# Patient Record
Sex: FEMALE | Race: WHITE | NOT HISPANIC OR LATINO | Employment: OTHER | URBAN - METROPOLITAN AREA
[De-identification: names, ages, dates, MRNs, and addresses within clinical notes are randomized per-mention and may not be internally consistent; named-entity substitution may affect disease eponyms.]

---

## 2018-05-02 ENCOUNTER — OFFICE VISIT (OUTPATIENT)
Dept: GYNECOLOGIC ONCOLOGY | Facility: HOSPITAL | Age: 48
End: 2018-05-02
Payer: COMMERCIAL

## 2018-05-02 VITALS
HEIGHT: 61 IN | DIASTOLIC BLOOD PRESSURE: 78 MMHG | BODY MASS INDEX: 37.38 KG/M2 | HEART RATE: 92 BPM | SYSTOLIC BLOOD PRESSURE: 108 MMHG | WEIGHT: 198 LBS | TEMPERATURE: 98.7 F | RESPIRATION RATE: 16 BRPM

## 2018-05-02 DIAGNOSIS — D07.1 VIN III (VULVAR INTRAEPITHELIAL NEOPLASIA III): Primary | ICD-10-CM

## 2018-05-02 PROCEDURE — 99243 OFF/OP CNSLTJ NEW/EST LOW 30: CPT | Performed by: OBSTETRICS & GYNECOLOGY

## 2018-05-02 RX ORDER — TRAMADOL HYDROCHLORIDE 50 MG/1
50 TABLET ORAL AS NEEDED
COMMUNITY

## 2018-05-02 RX ORDER — CLONAZEPAM 2 MG/1
2 TABLET ORAL AS NEEDED
COMMUNITY
End: 2019-01-16 | Stop reason: HOSPADM

## 2018-05-02 RX ORDER — ESCITALOPRAM OXALATE 20 MG/1
20 TABLET ORAL DAILY
COMMUNITY

## 2018-05-02 RX ORDER — CYCLOBENZAPRINE HCL 5 MG
10 TABLET ORAL AS NEEDED
COMMUNITY

## 2018-05-02 RX ORDER — METOCLOPRAMIDE 5 MG/1
5 TABLET ORAL DAILY
COMMUNITY
End: 2019-01-16 | Stop reason: HOSPADM

## 2018-05-02 RX ORDER — RANITIDINE 150 MG/1
50 CAPSULE ORAL DAILY
COMMUNITY
End: 2019-01-16 | Stop reason: HOSPADM

## 2018-05-02 RX ORDER — LANOLIN ALCOHOL/MO/W.PET/CERES
1 CREAM (GRAM) TOPICAL DAILY
COMMUNITY

## 2018-05-02 RX ORDER — OMEPRAZOLE 40 MG/1
40 CAPSULE, DELAYED RELEASE ORAL DAILY
COMMUNITY

## 2018-05-02 RX ORDER — RANITIDINE 25 MG/ML
50 INJECTION, SOLUTION INTRAMUSCULAR; INTRAVENOUS EVERY 8 HOURS
COMMUNITY
End: 2019-01-16 | Stop reason: HOSPADM

## 2018-05-02 RX ORDER — IMIQUIMOD 12.5 MG/.25G
1 CREAM TOPICAL 2 TIMES WEEKLY
Qty: 32 PACKET | Refills: 0 | Status: SHIPPED | OUTPATIENT
Start: 2018-05-03 | End: 2019-01-16 | Stop reason: HOSPADM

## 2018-05-02 NOTE — PROGRESS NOTES
Assessment/Plan:    Problem List Items Addressed This Visit        Genitourinary    MARGO III (vulvar intraepithelial neoplasia III) - Primary       80-year-old with biopsy-proven MARGO 3 localized to the perineal body  Her performance status is 0     1  I encouraged continued cessation of cigarette smoking  2   We discussed the pathophysiology of HPV disease  3  I discussed to treatment options - surgical resection of the perineal body lesion versus aldara therapy  She understands the risks and benefits of both approaches and has agreed to aldara therapy  4   Aldara was prescribed twice weekly for a total of 16 weeks  She will return in approximately 2 months for interval evaluation  If she is unable to tolerate aldara, surgical resection will be performed  5   She understands that after treatment, she will require colposcopy approximately every 6 months and that regardless of treatment, dysplasia can recur on the vulva  Thank you for the courtesy of this consultation  All questions were answered by the end of the visit  Relevant Medications    imiquimod (ALDARA) 5 % cream (Start on 5/3/2018)              CHIEF COMPLAINT:       Subjective:     Problem:  Cancer Staging  No matching staging information was found for the patient  Previous therapy:   No history exists  Patient ID: Sheyla Kidd is a 52 y o  female    80-year-old referred as a consultation by Dr Jason Olszewski  to discuss treatment options for biopsy-proven MARGO 3  Vulvar biopsies were performed on 4/12/2018  Pap smear on 3/6/2018 was within normal limits  She has no current vulvar complaints  She quit smoking as of 2 days ago  Review of Systems   Genitourinary: Negative for pelvic pain and vaginal bleeding  All other systems reviewed and are negative        Current Outpatient Prescriptions   Medication Sig Dispense Refill    clonazePAM (KLONOPIN) 2 mg tablet Take 2 mg by mouth as needed for seizures      cyclobenzaprine (FLEXERIL) 5 mg tablet Take 10 mg by mouth as needed for muscle spasms      escitalopram (LEXAPRO) 20 mg tablet Take 20 mg by mouth daily      glucosamine-chondroitin 500-400 MG tablet Take 1 tablet by mouth daily      Linaclotide (LINZESS) 145 MCG CAPS Take 145 mcg by mouth daily      metoclopramide (REGLAN) 5 mg tablet Take 5 mg by mouth daily      omeprazole (PriLOSEC) 40 MG capsule Take 40 mg by mouth daily      raNITIdine (ZANTAC) 50 mg/2 mL injection 50 mg every 8 (eight) hours      traMADol (ULTRAM) 50 mg tablet Take 50 mg by mouth as needed for moderate pain      [START ON 5/3/2018] imiquimod (ALDARA) 5 % cream Apply 1 packet topically 2 (two) times a week 32 packet 0    ranitidine (ZANTAC) 150 MG capsule Take 50 mg by mouth daily       No current facility-administered medications for this visit  No Known Allergies    Past Medical History:   Diagnosis Date    Anxiety     Gastroparesis        Past Surgical History:   Procedure Laterality Date    BUNIONECTOMY       SECTION      CHOLECYSTECTOMY      COLONOSCOPY         OB History     No data available          Family History   Problem Relation Age of Onset    Cervical cancer Mother     Vaginal cancer Mother        The following portions of the patient's history were reviewed and updated as appropriate: allergies, current medications, past family history, past medical history, past social history, past surgical history and problem list       Objective:    Blood pressure 108/78, pulse 92, temperature 98 7 °F (37 1 °C), temperature source Oral, resp  rate 16, height 5' 1" (1 549 m), weight 89 8 kg (198 lb)  Body mass index is 37 41 kg/m²  Physical Exam   Constitutional: She is oriented to person, place, and time  She appears well-developed and well-nourished  No distress  Pulmonary/Chest: Effort normal    Abdominal: Soft  There is no tenderness     Genitourinary:   Genitourinary Comments:  External female genitalia reveal an incision at the perineal body  There is granulation tissue present  The incision is almost healed  There is no evidence of erythema, induration, or purulent discharge  Adjacent to the incision to the left of the perineal body is residual dysplasia  It measures approximately 2 cm in length by 0 5 cm in width  and extends toward the anus  There are no other visible lesions  Musculoskeletal: She exhibits no edema or tenderness  Neurological: She is alert and oriented to person, place, and time  Skin: Skin is warm and dry  She is not diaphoretic  Psychiatric: She has a normal mood and affect   Her behavior is normal  Judgment and thought content normal

## 2018-05-02 NOTE — ASSESSMENT & PLAN NOTE
66-year-old with biopsy-proven MARGO 3 localized to the perineal body  Her performance status is 0     1  I encouraged continued cessation of cigarette smoking  2   We discussed the pathophysiology of HPV disease  3  I discussed to treatment options - surgical resection of the perineal body lesion versus aldara therapy  She understands the risks and benefits of both approaches and has agreed to aldara therapy  4   Aldara was prescribed twice weekly for a total of 16 weeks  She will return in approximately 2 months for interval evaluation  If she is unable to tolerate aldara, surgical resection will be performed  5   She understands that after treatment, she will require colposcopy approximately every 6 months and that regardless of treatment, dysplasia can recur on the vulva  Thank you for the courtesy of this consultation  All questions were answered by the end of the visit

## 2018-05-02 NOTE — LETTER
May 2, 2018     [address]    Patient: Dana Haley   YOB: 1970   Date of Visit: 5/2/2018       Dear Dr Liana Shore: Thank you for referring Chani Alextyrone to me for evaluation  Below are my notes for this consultation  If you have questions, please do not hesitate to call me  I look forward to following your patient along with you  Sincerely,        Shiela Gtz MD        CC: No Recipients  Shiela Gtz MD  5/2/2018  9:35 AM  Sign at close encounter  Assessment/Plan:    Problem List Items Addressed This Visit        Genitourinary    MARGO III (vulvar intraepithelial neoplasia III) - Primary       77-year-old with biopsy-proven MARGO 3 localized to the perineal body  Her performance status is 0     1  I encouraged continued cessation of cigarette smoking  2   We discussed the pathophysiology of HPV disease  3  I discussed to treatment options - surgical resection of the perineal body lesion versus aldara therapy  She understands the risks and benefits of both approaches and has agreed to aldara therapy  4   Aldara was prescribed twice weekly for a total of 16 weeks  She will return in approximately 2 months for interval evaluation  If she is unable to tolerate aldara, surgical resection will be performed  5   She understands that after treatment, she will require colposcopy approximately every 6 months and that regardless of treatment, dysplasia can recur on the vulva  Thank you for the courtesy of this consultation  All questions were answered by the end of the visit  Relevant Medications    imiquimod (ALDARA) 5 % cream (Start on 5/3/2018)              CHIEF COMPLAINT:       Subjective:     Problem:  Cancer Staging  No matching staging information was found for the patient  Previous therapy:   No history exists           Patient ID: Dana Haley is a 52 y o  female    77-year-old referred as a consultation by Dr Rachelle Flowers  to discuss treatment options for biopsy-proven MARGO 3  Vulvar biopsies were performed on 2018  Pap smear on 3/6/2018 was within normal limits  She has no current vulvar complaints  She quit smoking as of 2 days ago  Review of Systems   Genitourinary: Negative for pelvic pain and vaginal bleeding  All other systems reviewed and are negative  Current Outpatient Prescriptions   Medication Sig Dispense Refill    clonazePAM (KLONOPIN) 2 mg tablet Take 2 mg by mouth as needed for seizures      cyclobenzaprine (FLEXERIL) 5 mg tablet Take 10 mg by mouth as needed for muscle spasms      escitalopram (LEXAPRO) 20 mg tablet Take 20 mg by mouth daily      glucosamine-chondroitin 500-400 MG tablet Take 1 tablet by mouth daily      Linaclotide (LINZESS) 145 MCG CAPS Take 145 mcg by mouth daily      metoclopramide (REGLAN) 5 mg tablet Take 5 mg by mouth daily      omeprazole (PriLOSEC) 40 MG capsule Take 40 mg by mouth daily      raNITIdine (ZANTAC) 50 mg/2 mL injection 50 mg every 8 (eight) hours      traMADol (ULTRAM) 50 mg tablet Take 50 mg by mouth as needed for moderate pain      [START ON 5/3/2018] imiquimod (ALDARA) 5 % cream Apply 1 packet topically 2 (two) times a week 32 packet 0    ranitidine (ZANTAC) 150 MG capsule Take 50 mg by mouth daily       No current facility-administered medications for this visit          No Known Allergies    Past Medical History:   Diagnosis Date    Anxiety     Gastroparesis        Past Surgical History:   Procedure Laterality Date    BUNIONECTOMY       SECTION      CHOLECYSTECTOMY      COLONOSCOPY         OB History     No data available          Family History   Problem Relation Age of Onset    Cervical cancer Mother     Vaginal cancer Mother        The following portions of the patient's history were reviewed and updated as appropriate: allergies, current medications, past family history, past medical history, past social history, past surgical history and problem list       Objective:    Blood pressure 108/78, pulse 92, temperature 98 7 °F (37 1 °C), temperature source Oral, resp  rate 16, height 5' 1" (1 549 m), weight 89 8 kg (198 lb)  Body mass index is 37 41 kg/m²  Physical Exam   Constitutional: She is oriented to person, place, and time  She appears well-developed and well-nourished  No distress  Pulmonary/Chest: Effort normal    Abdominal: Soft  There is no tenderness  Genitourinary:   Genitourinary Comments:  External female genitalia reveal an incision at the perineal body  There is granulation tissue present  The incision is almost healed  There is no evidence of erythema, induration, or purulent discharge  Adjacent to the incision to the left of the perineal body is residual dysplasia  It measures approximately 2 cm in length by 0 5 cm in width  and extends toward the anus  There are no other visible lesions  Musculoskeletal: She exhibits no edema or tenderness  Neurological: She is alert and oriented to person, place, and time  Skin: Skin is warm and dry  She is not diaphoretic  Psychiatric: She has a normal mood and affect   Her behavior is normal  Judgment and thought content normal

## 2018-06-27 ENCOUNTER — OFFICE VISIT (OUTPATIENT)
Dept: GYNECOLOGIC ONCOLOGY | Facility: HOSPITAL | Age: 48
End: 2018-06-27
Payer: COMMERCIAL

## 2018-06-27 VITALS
WEIGHT: 198 LBS | TEMPERATURE: 98.4 F | RESPIRATION RATE: 18 BRPM | HEIGHT: 61 IN | BODY MASS INDEX: 37.38 KG/M2 | DIASTOLIC BLOOD PRESSURE: 84 MMHG | SYSTOLIC BLOOD PRESSURE: 124 MMHG | HEART RATE: 84 BPM

## 2018-06-27 DIAGNOSIS — D07.1 VIN III (VULVAR INTRAEPITHELIAL NEOPLASIA III): Primary | ICD-10-CM

## 2018-06-27 PROCEDURE — 99212 OFFICE O/P EST SF 10 MIN: CPT | Performed by: OBSTETRICS & GYNECOLOGY

## 2018-06-27 RX ORDER — TEMAZEPAM 30 MG/1
CAPSULE ORAL
Refills: 1 | COMMUNITY
Start: 2018-06-04 | End: 2019-01-16 | Stop reason: HOSPADM

## 2018-06-27 RX ORDER — TRAZODONE HYDROCHLORIDE 100 MG/1
TABLET ORAL
COMMUNITY
Start: 2018-06-20 | End: 2019-01-16 | Stop reason: HOSPADM

## 2018-06-27 NOTE — ASSESSMENT & PLAN NOTE
MARGO 3 undergoing treatment with aldara  She is tolerating treatment well  There has been measurable response on the vulva  Her performance status is 0   1  Continue aldara for 8 additional weeks  She will return for colposcopy and biopsy post treatment

## 2018-08-29 ENCOUNTER — OFFICE VISIT (OUTPATIENT)
Dept: GYNECOLOGIC ONCOLOGY | Facility: HOSPITAL | Age: 48
End: 2018-08-29
Payer: COMMERCIAL

## 2018-08-29 VITALS
TEMPERATURE: 98.4 F | HEIGHT: 61 IN | DIASTOLIC BLOOD PRESSURE: 82 MMHG | HEART RATE: 94 BPM | WEIGHT: 201 LBS | RESPIRATION RATE: 16 BRPM | BODY MASS INDEX: 37.95 KG/M2 | SYSTOLIC BLOOD PRESSURE: 148 MMHG

## 2018-08-29 DIAGNOSIS — D07.1 VIN III (VULVAR INTRAEPITHELIAL NEOPLASIA III): Primary | ICD-10-CM

## 2018-08-29 PROCEDURE — 99999 PR OFFICE/OUTPT VISIT,PROCEDURE ONLY: CPT | Performed by: OBSTETRICS & GYNECOLOGY

## 2018-08-29 PROCEDURE — 56820 COLPOSCOPY VULVA: CPT | Performed by: OBSTETRICS & GYNECOLOGY

## 2018-08-29 NOTE — PROGRESS NOTES
Assessment/Plan:    Problem List Items Addressed This Visit        Genitourinary    MARGO III (vulvar intraepithelial neoplasia III) - Primary       Complete response to aldara therapy  1  Return in 4 months for vulvar colposcopy                 CHIEF COMPLAINT:  Here for colposcopy      Problem:   MARGO 3    Previous therapy:  Aldara for 16 weeks ( 31 treatments) completed 8/29/2018      Patient ID: Mariajose Taylor is a 50 y o  female   45-year-old returns for vulvar colposcopy after completion of aldara therapy  The following portions of the patient's history were reviewed and updated as appropriate: allergies, current medications, past family history, past medical history, past social history, past surgical history and problem list     Review of Systems    Current Outpatient Prescriptions   Medication Sig Dispense Refill    clonazePAM (KLONOPIN) 2 mg tablet Take 2 mg by mouth as needed for seizures      cyclobenzaprine (FLEXERIL) 5 mg tablet Take 10 mg by mouth as needed for muscle spasms      escitalopram (LEXAPRO) 20 mg tablet Take 20 mg by mouth daily      glucosamine-chondroitin 500-400 MG tablet Take 1 tablet by mouth daily      imiquimod (ALDARA) 5 % cream Apply 1 packet topically 2 (two) times a week 32 packet 0    Linaclotide (LINZESS) 145 MCG CAPS Take 145 mcg by mouth daily      metoclopramide (REGLAN) 5 mg tablet Take 5 mg by mouth daily      omeprazole (PriLOSEC) 40 MG capsule Take 40 mg by mouth daily      ranitidine (ZANTAC) 150 MG capsule Take 50 mg by mouth daily      raNITIdine (ZANTAC) 50 mg/2 mL injection 50 mg every 8 (eight) hours      temazepam (RESTORIL) 30 mg capsule TAKE ONE CAPSULE BY MOUTH AT BEDTIME AS NEEDED FOR INSOMNIA  1    traMADol (ULTRAM) 50 mg tablet Take 50 mg by mouth as needed for moderate pain      traZODone (DESYREL) 100 mg tablet        No current facility-administered medications for this visit              Objective:    Blood pressure 148/82, pulse 94, temperature 98 4 °F (36 9 °C), temperature source Oral, resp  rate 16, height 5' 1" (1 549 m), weight 91 2 kg (201 lb)  Body mass index is 37 98 kg/m²  Body surface area is 1 89 meters squared  Physical Exam   Genitourinary:   Genitourinary Comments:  External female genitalia without visible masses or lesions  Colposcopy  Date/Time: 8/29/2018 2:21 PM  Performed by: Shahram Singh by: Ander Andres     Consent:     Consent obtained:  Verbal    Consent given by:  Patient    Patient questions answered: yes      Patient agrees, verbalizes understanding, and wants to proceed: yes    Pre-procedure:     Prepped with: acetic acid    Indication:     Indications: MARGO 3  Procedure:     Procedure: Colposcopy of Vulva only      Biopsy(s): no    Post-procedure:     Impression comment:    No residual dysplasia  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

## 2018-08-29 NOTE — LETTER
August 29, 2018     [address]    Patient: Melany Warner   YOB: 1970   Date of Visit: 8/29/2018       Dear Dr Kamlesh Britton: Thank you for referring Joycelynchikis De Santiago to me for evaluation  Below are my notes for this consultation  If you have questions, please do not hesitate to call me  I look forward to following your patient along with you  Sincerely,        Mora Mcgee MD        CC: No Recipients  Mora Mcgee MD  8/29/2018  2:22 PM  Sign at close encounter  Assessment/Plan:    Problem List Items Addressed This Visit        Genitourinary    MARGO III (vulvar intraepithelial neoplasia III) - Primary       Complete response to aldara therapy  1  Return in 4 months for vulvar colposcopy                 CHIEF COMPLAINT:  Here for colposcopy      Problem:   MARGO 3    Previous therapy:  Aldara for 16 weeks ( 31 treatments) completed 8/29/2018      Patient ID: Melany Warner is a 50 y o  female   49-year-old returns for vulvar colposcopy after completion of aldara therapy          The following portions of the patient's history were reviewed and updated as appropriate: allergies, current medications, past family history, past medical history, past social history, past surgical history and problem list     Review of Systems    Current Outpatient Prescriptions   Medication Sig Dispense Refill    clonazePAM (KLONOPIN) 2 mg tablet Take 2 mg by mouth as needed for seizures      cyclobenzaprine (FLEXERIL) 5 mg tablet Take 10 mg by mouth as needed for muscle spasms      escitalopram (LEXAPRO) 20 mg tablet Take 20 mg by mouth daily      glucosamine-chondroitin 500-400 MG tablet Take 1 tablet by mouth daily      imiquimod (ALDARA) 5 % cream Apply 1 packet topically 2 (two) times a week 32 packet 0    Linaclotide (LINZESS) 145 MCG CAPS Take 145 mcg by mouth daily      metoclopramide (REGLAN) 5 mg tablet Take 5 mg by mouth daily  omeprazole (PriLOSEC) 40 MG capsule Take 40 mg by mouth daily      ranitidine (ZANTAC) 150 MG capsule Take 50 mg by mouth daily      raNITIdine (ZANTAC) 50 mg/2 mL injection 50 mg every 8 (eight) hours      temazepam (RESTORIL) 30 mg capsule TAKE ONE CAPSULE BY MOUTH AT BEDTIME AS NEEDED FOR INSOMNIA  1    traMADol (ULTRAM) 50 mg tablet Take 50 mg by mouth as needed for moderate pain      traZODone (DESYREL) 100 mg tablet        No current facility-administered medications for this visit  Objective:    Blood pressure 148/82, pulse 94, temperature 98 4 °F (36 9 °C), temperature source Oral, resp  rate 16, height 5' 1" (1 549 m), weight 91 2 kg (201 lb)  Body mass index is 37 98 kg/m²  Body surface area is 1 89 meters squared  Physical Exam   Genitourinary:   Genitourinary Comments:  External female genitalia without visible masses or lesions  Colposcopy  Date/Time: 8/29/2018 2:21 PM  Performed by: Greta Whittaker by: Meenu Rubio     Consent:     Consent obtained:  Verbal    Consent given by:  Patient    Patient questions answered: yes      Patient agrees, verbalizes understanding, and wants to proceed: yes    Pre-procedure:     Prepped with: acetic acid    Indication:     Indications: MARGO 3  Procedure:     Procedure: Colposcopy of Vulva only      Biopsy(s): no    Post-procedure:     Impression comment:    No residual dysplasia  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

## 2019-01-16 ENCOUNTER — OFFICE VISIT (OUTPATIENT)
Dept: GYNECOLOGIC ONCOLOGY | Facility: HOSPITAL | Age: 49
End: 2019-01-16
Payer: COMMERCIAL

## 2019-01-16 VITALS
HEIGHT: 61 IN | BODY MASS INDEX: 36.44 KG/M2 | DIASTOLIC BLOOD PRESSURE: 78 MMHG | TEMPERATURE: 98.1 F | SYSTOLIC BLOOD PRESSURE: 118 MMHG | WEIGHT: 193 LBS | HEART RATE: 98 BPM | RESPIRATION RATE: 16 BRPM

## 2019-01-16 DIAGNOSIS — D07.1 VIN III (VULVAR INTRAEPITHELIAL NEOPLASIA III): Primary | ICD-10-CM

## 2019-01-16 PROCEDURE — 56820 COLPOSCOPY VULVA: CPT | Performed by: OBSTETRICS & GYNECOLOGY

## 2019-01-16 PROCEDURE — 99999 PR OFFICE/OUTPT VISIT,PROCEDURE ONLY: CPT | Performed by: OBSTETRICS & GYNECOLOGY

## 2019-01-16 RX ORDER — LORAZEPAM 1 MG/1
1 TABLET ORAL
Refills: 0 | COMMUNITY
Start: 2019-01-02

## 2019-01-16 NOTE — ASSESSMENT & PLAN NOTE
Colposcopy today without evidence of lesion  1  Return in 6 months for colposcopy if negative, will go to annual evaluation

## 2019-01-16 NOTE — LETTER
January 16, 2019     [address]  [address] [facility]    Patient: Jayy Raygoza   YOB: 1970   Date of Visit: 1/16/2019       Dear Dr Zhang Living: Thank you for referring Amaury Owens to me for evaluation  Below are my notes for this consultation  If you have questions, please do not hesitate to call me  I look forward to following your patient along with you  Sincerely,        Sissy Cisneros MD        CC: No Recipients  Sissy Cisneros MD  1/16/2019  4:26 PM  Sign at close encounter  Colposcopy  Date/Time: 1/16/2019 4:25 PM  Performed by: Amparo Bolaños by: Agustin Henry     Consent:     Consent obtained:  Verbal    Consent given by:  Patient    Patient questions answered: yes    Pre-procedure:     Prepped with: acetic acid    Indication:     Indications: MARGO 3  Procedure:     Procedure: Colposcopy of Vulva only      Biopsy(s): no      Specimen to pathology: no    Post-procedure:     Findings comment:  No evidence of dysplasia    Patient tolerance of procedure:   Tolerated well, no immediate complications

## 2019-01-16 NOTE — PROGRESS NOTES
Colposcopy  Date/Time: 1/16/2019 4:25 PM  Performed by: Lizy Pan by: Joaquín Montilla     Consent:     Consent obtained:  Verbal    Consent given by:  Patient    Patient questions answered: yes    Pre-procedure:     Prepped with: acetic acid    Indication:     Indications: MARGO 3  Procedure:     Procedure: Colposcopy of Vulva only      Biopsy(s): no      Specimen to pathology: no    Post-procedure:     Findings comment:  No evidence of dysplasia    Patient tolerance of procedure:   Tolerated well, no immediate complications

## 2019-03-07 ENCOUNTER — TELEPHONE (OUTPATIENT)
Dept: GASTROENTEROLOGY | Facility: CLINIC | Age: 49
End: 2019-03-07

## 2019-03-07 NOTE — TELEPHONE ENCOUNTER
Pt left  mssg stating she sees Dr Pearly Sandhoff and Barrera Vicente; has hemorrhoid problem/is not able to get relief w/ Prep H or sitz; asks for other suggestions, maybe Rx?; has had about a wk; difficulty sitting and BMs; req  [phone]

## 2019-03-07 NOTE — TELEPHONE ENCOUNTER
I spoke with patient, she has started with constipation about one week ago  Painful, she can feel the hemorrhoid and she reports some blood in toilet bowl post evacuation  She states the bleeding does stop  I reviewed that she was prescribed Linzess in March 2018  She did take the medication but she experienced diarrhea on it  She states she has been fine and no issues with bowel movements until one week ago  She has had no change in diet  Patient offered 4pm appointment today and she will call back to confirm

## 2019-03-12 ENCOUNTER — OFFICE VISIT (OUTPATIENT)
Dept: GASTROENTEROLOGY | Facility: CLINIC | Age: 49
End: 2019-03-12
Payer: COMMERCIAL

## 2019-03-12 ENCOUNTER — DOCUMENTATION (OUTPATIENT)
Dept: GASTROENTEROLOGY | Facility: CLINIC | Age: 49
End: 2019-03-12

## 2019-03-12 VITALS
BODY MASS INDEX: 34.41 KG/M2 | DIASTOLIC BLOOD PRESSURE: 78 MMHG | SYSTOLIC BLOOD PRESSURE: 112 MMHG | HEART RATE: 84 BPM | WEIGHT: 187 LBS | HEIGHT: 62 IN

## 2019-03-12 DIAGNOSIS — K62.89 RECTAL PAIN: Primary | ICD-10-CM

## 2019-03-12 DIAGNOSIS — K62.5 RECTAL BLEEDING: ICD-10-CM

## 2019-03-12 DIAGNOSIS — Z12.11 SCREENING FOR COLON CANCER: ICD-10-CM

## 2019-03-12 PROCEDURE — 99213 OFFICE O/P EST LOW 20 MIN: CPT | Performed by: NURSE PRACTITIONER

## 2019-03-12 NOTE — LETTER
March 12, 2019     Patient: Mandy Boogie   YOB: 1970   Date of Visit: 3/12/2019     Attn: Taran    To Whom it May Concern:    Socrates Mercado was seen in my clinic on 3/12/2019  Please excuse her absence from work 3/13/19  If you have any questions or concerns, please don't hesitate to call           Sincerely,        AWILDA Dozier

## 2019-03-12 NOTE — PATIENT INSTRUCTIONS
Increase fluid and fiber  Colace 100 mg twice a day  Sitz bath  Analpram cream 2 5% t i d  escribed to [facility]  Schedule hemorrhoidal banding

## 2019-03-12 NOTE — PROGRESS NOTES
[facility] - Outpatient Follow-up Note  Dustin Alvarez 50 y o  female MRN: [id number]  Encounter: [id number]    ASSESSMENT AND PLAN:      1  Rectal pain  Rectal pain with associated intermittent episodes of rectal bleeding for the past 2 weeks  Likely secondary to internal hemorrhoids appreciated on today's examination  I did not palpate an anal fissure  - Increase fluid and fiber  - Colace 100 mg b i d   - Sitz baths  - Hydrocortisone-pramoxine (ANALPRAM-HC) 2 5-1 % rectal cream; Insert into the rectum 3 (three) times a day  Dispense: 30 g; Refill: 0  - schedule hemorrhoidal banding      2  Rectal bleeding  Intermittent episodes of bright red blood per rectum over the past 2 weeks  No further bleeding for the past several days  Likely hemorrhoidal in nature  A colonoscopy in 2016 did show grade 1 internal hemorrhoids  3  Screening for colon cancer  Surveillance colonoscopy advised in May of 2021 for a personal history of colon polyps    Followup Appointment[de-identified]  4 weeks  ______________________________________________________________________    Chief Complaint   Patient presents with    Rectal Pain    Hemorrhoids     HPI:   History of rectal pain with intermittent episodes of bright red blood per rectum over the past 2 weeks  The pain progressed and prompted today's office visit  No further rectal bleeding for the past few days  She has tried over-the-counter Preparation-H, but has been applying this externally  She has also taking Colace twice daily and doing daily Sitzs baths with some relief approximately a week ago but then symptoms returned and became more severe over the past week  Denies constipation or change in bowel habits  Having a bowel movement daily  No fevers, chills or abdominal pain      Historical Information   Past Medical History:   Diagnosis Date    Anxiety     Gastroparesis     Vulvar intraepithelial neoplasia (MARGO) grade 3     Status post treatment with topical chemotherapy that ended 2018     Past Surgical History:   Procedure Laterality Date    BUNIONECTOMY       SECTION      CHOLECYSTECTOMY      COLONOSCOPY       Social History     Substance and Sexual Activity   Alcohol Use Yes    Frequency: 2-4 times a month     Social History     Substance and Sexual Activity   Drug Use No     Social History     Tobacco Use   Smoking Status Current Every Day Smoker    Packs/day: 0 25    Types: Cigarettes   Smokeless Tobacco Never Used     Family History   Problem Relation Age of Onset    Cervical cancer Mother     Vaginal cancer Mother     Colon cancer Neg Hx     Colon polyps Neg Hx          Current Outpatient Medications:     cyclobenzaprine (FLEXERIL) 5 mg tablet    escitalopram (LEXAPRO) 20 mg tablet    glucosamine-chondroitin 500-400 MG tablet    LORazepam (ATIVAN) 1 mg tablet    omeprazole (PriLOSEC) 40 MG capsule    traMADol (ULTRAM) 50 mg tablet    hydrocortisone-pramoxine (ANALPRAM-HC) 2 5-1 % rectal cream  No Known Allergies    10 Point REVIEW OF SYSTEMS IS OTHERWISE NEGATIVE, except rectal pain and rectal bleeding  PHYSICAL EXAM:    Blood pressure 112/78, pulse 84, height 5' 2" (1 575 m), weight 84 8 kg (187 lb)  Body mass index is 34 2 kg/m²  General Appearance:  Alert, cooperative, no distress  HEENT:  Normocephalic, atraumatic, anicteric  Lungs: Clear to auscultation bilaterally; no rales, rhonchi or wheezing; respirations unlabored   Heart: Regular rate and rhythm; no murmur, rub, or gallop  Abdomen: Soft, non-tender, non-distended; normal bowel sounds; no masses, no organomegaly   Rectal:  Internal hemorrhoid appreciated    No evidence of fissures or external lesions  Extremities: No cyanosis, clubbing or edema   Skin:  No jaundice, rashes, or lesions   Back - without CVA tenderness    Lab Results:   No results found for: WBC, HGB, HCT, MCV, PLT  No results found for: NA, K, CL, CO2, ANIONGAP, BUN, CREATININE, GLUCOSE, GLUF, CALCIUM, CORRECTEDCA, AST, ALT, ALKPHOS, PROT, BILITOT, EGFR  No results found for: IRON, TIBC, FERRITIN  No results found for: LIPASE    Radiology Results:   No results found

## 2019-03-13 ENCOUNTER — TELEPHONE (OUTPATIENT)
Dept: GASTROENTEROLOGY | Facility: CLINIC | Age: 49
End: 2019-03-13

## 2019-03-13 NOTE — TELEPHONE ENCOUNTER
Pharmacist calling noting receipt yesterday of Analpram HC cream  Rx is not cov'd and he gave Pt the best possible price but it is still expensive   Pharm asks if able to substitute Proctozone HC cream? -[phone]

## 2019-03-28 ENCOUNTER — OFFICE VISIT (OUTPATIENT)
Dept: GASTROENTEROLOGY | Facility: CLINIC | Age: 49
End: 2019-03-28
Payer: COMMERCIAL

## 2019-03-28 VITALS
DIASTOLIC BLOOD PRESSURE: 80 MMHG | SYSTOLIC BLOOD PRESSURE: 110 MMHG | BODY MASS INDEX: 34.6 KG/M2 | HEIGHT: 62 IN | HEART RATE: 70 BPM | WEIGHT: 188 LBS

## 2019-03-28 DIAGNOSIS — K64.9 HEMORRHOIDS, UNSPECIFIED HEMORRHOID TYPE: Primary | ICD-10-CM

## 2019-03-28 PROCEDURE — 46221 LIGATION OF HEMORRHOID(S): CPT | Performed by: INTERNAL MEDICINE

## 2019-03-28 NOTE — PROGRESS NOTES
[facility] - Hemorrhoid Banding Kaylee Bar 50 y o  female MRN: [id number]  Encounter: [id number]          ASSESSMENT AND PLAN:    The right anterior hemorrhoid area was banded today  The patient was instructed to avoid constipation and straining, and educated in appropriate fiber intake  HPI: Kaylee Bar is a 50y o  year old female who presents with rectal bleeding and pain due to hemorrhoids  Previous treatments include:  High-fiber diet  Bands were previously placed:  None   Current Fiber intake:   Complications of prior therapy include:      The patient provided consent for banding  and was placed in the left lateral position  Rectal exam showed grade 2 hemorrhoids  The O'Joselito ligator was advanced and a band was applied without difficulty   Repeat rectal exam confirmed appropriate placement  The patient was discharged home after observation in the waiting area  The exam was chaperoned by MA    Anal Excision Procedures  Performed by: Benigno Joyner MD  Authorized by: Benigno Joyner MD     Universal Protocol:     Verbal consent obtained?: Yes      Written consent obtained?: Yes      Risks and benefits: Risks, benefits and alternatives were discussed      Consent given by:  Patient    Patient states understanding of procedure being performed: Yes      Patient's understanding of procedure matches consent: Yes      Procedure consent matches procedure scheduled: Yes      Relevant documents present and verified: Yes      Test results available and properly labeled: Yes      Required items: Required blood products, implants, devices and special equipment available      Patient identity confirmed:  Verbally with patient    Time out: Immediately prior to the procedure a time out was called    A time out verifies correct patient, procedure, equipment, support staff and site/side marked as required:   Procedure Type: hemorrhoidectomy    Hemorrhoidectomy Details: Hemorrhoid type: internal    Internal position:   Three o'clock  Single rubber band ligation    Patient tolerance:  Patient tolerated the procedure well with no immediate complications

## 2019-03-28 NOTE — PATIENT INSTRUCTIONS
Hemorrhoids   WHAT YOU NEED TO KNOW:   Hemorrhoids are swollen blood vessels inside your rectum (internal hemorrhoids) or on your anus (external hemorrhoids)  Sometimes a hemorrhoid may prolapse  This means it extends out of your anus  DISCHARGE INSTRUCTIONS:   Return to the emergency department if:   · You have severe pain in your rectum or around your anus  · You have severe pain in your abdomen and you are vomiting  · You have bleeding from your anus that soaks through your underwear  Contact your healthcare provider if:   · You have frequent and painful bowel movements  · Your hemorrhoid looks or feels more swollen than usual      · You do not have a bowel movement for 2 days or more  · You see or feel tissue coming through your anus  · You have questions or concerns about your condition or care  Medicines: You may  need any of the following:  · A pad, cream, or ointment  can help decrease pain, swelling, and itching  · Stool softeners  help treat or prevent constipation  · NSAIDs , such as ibuprofen, help decrease swelling, pain, and fever  NSAIDs can cause stomach bleeding or kidney problems in certain people  If you take blood thinner medicine, always ask your healthcare provider if NSAIDs are safe for you  Always read the medicine label and follow directions  · Take your medicine as directed  Contact your healthcare provider if you think your medicine is not helping or if you have side effects  Tell him or her if you are allergic to any medicine  Keep a list of the medicines, vitamins, and herbs you take  Include the amounts, and when and why you take them  Bring the list or the pill bottles to follow-up visits  Carry your medicine list with you in case of an emergency  Manage your symptoms:   · Apply ice on your anus for 15 to 20 minutes every hour or as directed  Use an ice pack, or put crushed ice in a plastic bag   Cover it with a towel before you apply it to your anus  Ice helps prevent tissue damage and decreases swelling and pain  · Take a sitz bath  Fill a bathtub with 4 to 6 inches of warm water  You may also use a sitz bath pan that fits inside a toilet bowl  Sit in the sitz bath for 15 minutes  Do this 3 times a day, and after each bowel movement  The warm water can help decrease pain and swelling  · Keep your anal area clean  Gently wash the area with warm water daily  Soap may irritate the area  After a bowel movement, wipe with moist towelettes or wet toilet paper  Dry toilet paper can irritate the area  Prevent hemorrhoids:   · Do not strain to have a bowel movement  Do not sit on the toilet too long  These actions can increase pressure on the tissues in your rectum and anus  · Drink plenty of liquids  Liquids can help prevent constipation  Ask how much liquid to drink each day and which liquids are best for you  · Eat a variety of high-fiber foods  Examples include fruits, vegetables, and whole grains  Ask your healthcare provider how much fiber you need each day  You may need to take a fiber supplement  · Exercise as directed  Exercise, such as walking, may make it easier to have a bowel movement  Ask your healthcare provider to help you create an exercise plan  · Do not have anal sex  Anal sex can weaken the skin around your rectum and anus  · Avoid heavy lifting  This can cause straining and increase your risk for another hemorrhoid  Follow up with your healthcare provider as directed:  Write down your questions so you remember to ask them during your visits  © 2017 [facility] Information is for End User's use only and may not be sold, redistributed or otherwise used for commercial purposes  All illustrations and images included in CareNotes® are the copyrighted property of [employer] , [employer]  or [employer]  The above information is an  only   It is not intended as medical advice for individual conditions or treatments  Talk to your doctor, nurse or pharmacist before following any medical regimen to see if it is safe and effective for you

## 2019-03-28 NOTE — LETTER
March 28, 2019     Yanet Hirsch, [address] [facility] [address]  [address]    Patient: Rosi Gray   YOB: 1970   Date of Visit: 3/28/2019       Dear Dr Se Sims: Thank you for referring Isabella Bence to me for evaluation  Below are my notes for this consultation  If you have questions, please do not hesitate to call me  I look forward to following your patient along with you  Sincerely,        Miguel Briones MD        CC: No Recipients  Miguel Briones MD  3/28/2019 11:10 AM  Sign at close encounter  [facility] - Hemorrhoid Banding Rosi Gray 50 y o  female MRN: [id number]  Encounter: [id number]          ASSESSMENT AND PLAN:    The right anterior hemorrhoid area was banded today  The patient was instructed to avoid constipation and straining, and educated in appropriate fiber intake  HPI: Rosi Gray is a 50y o  year old female who presents with rectal bleeding and pain due to hemorrhoids  Previous treatments include:  High-fiber diet  Bands were previously placed:  None   Current Fiber intake:   Complications of prior therapy include:      The patient provided consent for banding  and was placed in the left lateral position  Rectal exam showed grade 2 hemorrhoids  The O'Joselito ligator was advanced and a band was applied without difficulty   Repeat rectal exam confirmed appropriate placement  The patient was discharged home after observation in the waiting area  The exam was chaperoned by MA    Anal Excision Procedures  Performed by: Miguel Briones MD  Authorized by: Miguel Briones MD     Universal Protocol:     Verbal consent obtained?: Yes      Written consent obtained?: Yes      Risks and benefits: Risks, benefits and alternatives were discussed      Consent given by:  Patient    Patient states understanding of procedure being performed: Yes      Patient's understanding of procedure matches consent: Yes      Procedure consent matches procedure scheduled: Yes      Relevant documents present and verified: Yes      Test results available and properly labeled: Yes      Required items: Required blood products, implants, devices and special equipment available      Patient identity confirmed:  Verbally with patient    Time out: Immediately prior to the procedure a time out was called    A time out verifies correct patient, procedure, equipment, support staff and site/side marked as required:   Procedure Type: hemorrhoidectomy    Hemorrhoidectomy Details:   Hemorrhoid type: internal    Internal position:   Three o'clock  Single rubber band ligation    Patient tolerance:  Patient tolerated the procedure well with no immediate complications

## 2019-05-01 ENCOUNTER — OFFICE VISIT (OUTPATIENT)
Dept: GASTROENTEROLOGY | Facility: CLINIC | Age: 49
End: 2019-05-01
Payer: COMMERCIAL

## 2019-05-01 VITALS
DIASTOLIC BLOOD PRESSURE: 68 MMHG | HEIGHT: 62 IN | BODY MASS INDEX: 34.78 KG/M2 | SYSTOLIC BLOOD PRESSURE: 100 MMHG | HEART RATE: 69 BPM | WEIGHT: 189 LBS

## 2019-05-01 DIAGNOSIS — K64.9 HEMORRHOIDS, UNSPECIFIED HEMORRHOID TYPE: Primary | ICD-10-CM

## 2019-05-01 PROCEDURE — 46221 LIGATION OF HEMORRHOID(S): CPT | Performed by: INTERNAL MEDICINE

## 2019-05-15 ENCOUNTER — OFFICE VISIT (OUTPATIENT)
Dept: GASTROENTEROLOGY | Facility: CLINIC | Age: 49
End: 2019-05-15
Payer: COMMERCIAL

## 2019-05-15 VITALS
HEIGHT: 62 IN | WEIGHT: 191 LBS | DIASTOLIC BLOOD PRESSURE: 86 MMHG | SYSTOLIC BLOOD PRESSURE: 118 MMHG | HEART RATE: 88 BPM | BODY MASS INDEX: 35.15 KG/M2

## 2019-05-15 DIAGNOSIS — K64.9 HEMORRHOIDS, UNSPECIFIED HEMORRHOID TYPE: Primary | ICD-10-CM

## 2019-05-15 PROCEDURE — 46221 LIGATION OF HEMORRHOID(S): CPT | Performed by: INTERNAL MEDICINE

## 2019-05-28 ENCOUNTER — TELEPHONE (OUTPATIENT)
Dept: GASTROENTEROLOGY | Facility: CLINIC | Age: 49
End: 2019-05-28

## 2019-07-17 ENCOUNTER — OFFICE VISIT (OUTPATIENT)
Dept: GYNECOLOGIC ONCOLOGY | Facility: HOSPITAL | Age: 49
End: 2019-07-17
Payer: COMMERCIAL

## 2019-07-17 VITALS
HEIGHT: 62 IN | DIASTOLIC BLOOD PRESSURE: 80 MMHG | RESPIRATION RATE: 16 BRPM | BODY MASS INDEX: 34.23 KG/M2 | TEMPERATURE: 99 F | WEIGHT: 186 LBS | HEART RATE: 80 BPM | SYSTOLIC BLOOD PRESSURE: 110 MMHG

## 2019-07-17 DIAGNOSIS — D07.1 VIN III (VULVAR INTRAEPITHELIAL NEOPLASIA III): Primary | ICD-10-CM

## 2019-07-17 PROCEDURE — 56820 COLPOSCOPY VULVA: CPT | Performed by: OBSTETRICS & GYNECOLOGY

## 2019-07-17 PROCEDURE — 99999 PR OFFICE/OUTPT VISIT,PROCEDURE ONLY: CPT | Performed by: OBSTETRICS & GYNECOLOGY

## 2019-07-17 NOTE — LETTER
July 17, 2019     [address]    Patient: Suzan Lovell   YOB: 1970   Date of Visit: 7/17/2019       Dear Dr Little Erazo: Thank you for referring Darius Harris to me for evaluation  Below are my notes for this consultation  If you have questions, please do not hesitate to call me  I look forward to following your patient along with you  Sincerely,        Brandee Sandoval MD        CC: No Recipients  Brandee Sandoval MD  7/17/2019  3:47 PM  Sign at close encounter  Assessment/Plan:    Problem List Items Addressed This Visit        Other    MARGO III (vulvar intraepithelial neoplasia III) - Primary     49-year-old with history of MARGO 3  She completed aldara therapy in August 2018  Colposcopy today is without evidence of dysplasia or malignancy  Performance status is 0   1  She will follow up with her gynecologist as scheduled  2  Return in 1 year for colposcopy                   CHIEF COMPLAINT:  Here for vulvar colposcopy          Patient ID: Suzan Lovell is a 52 y o  female  49-year-old with a history of MARGO 3 returns for vulvar colposcopy  She has seen her gynecologist in the interim  No interval change in her medications or medical history since her last visit  She is asymptomatic        The following portions of the patient's history were reviewed and updated as appropriate: allergies, current medications, past family history, past medical history, past social history, past surgical history and problem list     Review of Systems    Current Outpatient Medications   Medication Sig Dispense Refill    cyclobenzaprine (FLEXERIL) 5 mg tablet Take 10 mg by mouth as needed for muscle spasms      escitalopram (LEXAPRO) 20 mg tablet Take 20 mg by mouth daily      glucosamine-chondroitin 500-400 MG tablet Take 1 tablet by mouth daily      Green Tea 200 MG CAPS Take by mouth      hydrocortisone-pramoxine (ANALPRAM-HC) 2 5-1 % rectal cream Insert into the rectum 3 (three) times a day 30 g 0    LORazepam (ATIVAN) 1 mg tablet Take 1 mg by mouth daily at bedtime as needed  0    omeprazole (PriLOSEC) 40 MG capsule Take 40 mg by mouth daily      traMADol (ULTRAM) 50 mg tablet Take 50 mg by mouth as needed for moderate pain       No current facility-administered medications for this visit  Objective:    Blood pressure 110/80, pulse 80, temperature 99 °F (37 2 °C), temperature source Tympanic, resp  rate 16, height 5' 2" (1 575 m), weight 84 4 kg (186 lb)  Body mass index is 34 02 kg/m²  Body surface area is 1 85 meters squared  Physical Exam   Constitutional: She is oriented to person, place, and time  She appears well-developed and well-nourished  No distress  Pulmonary/Chest: Effort normal    Neurological: She is alert and oriented to person, place, and time  Skin: She is not diaphoretic  Psychiatric: She has a normal mood and affect  Her behavior is normal  Judgment and thought content normal      Colposcopy  Date/Time: 7/17/2019 3:46 PM  Performed by: Matty Mclean MD  Authorized by: Matty Mclean MD     Consent:     Consent obtained:  Verbal    Consent given by:  Patient  Pre-procedure:     Prepped with: acetic acid    Indication:     Indications: MARGO 3  Procedure:     Procedure: Colposcopy of Vulva only      Biopsy(s): no      Specimen to pathology: no    Post-procedure:     Impression comment:  No evidence of dysplasia or malignancy    Patient tolerance of procedure:   Tolerated well, no immediate complications

## 2019-07-17 NOTE — ASSESSMENT & PLAN NOTE
52year-old with history of MARGO 3  She completed aldara therapy in August 2018  Colposcopy today is without evidence of dysplasia or malignancy  Performance status is 0   1  She will follow up with her gynecologist as scheduled  2   Return in 1 year for colposcopy

## 2019-07-17 NOTE — PROGRESS NOTES
Assessment/Plan:    Problem List Items Addressed This Visit        Other    MARGO III (vulvar intraepithelial neoplasia III) - Primary     66-year-old with history of MARGO 3  She completed aldara therapy in August 2018  Colposcopy today is without evidence of dysplasia or malignancy  Performance status is 0   1  She will follow up with her gynecologist as scheduled  2  Return in 1 year for colposcopy                   CHIEF COMPLAINT:  Here for vulvar colposcopy          Patient ID: Dana Haley is a 52 y o  female  66-year-old with a history of MARGO 3 returns for vulvar colposcopy  She has seen her gynecologist in the interim  No interval change in her medications or medical history since her last visit  She is asymptomatic  The following portions of the patient's history were reviewed and updated as appropriate: allergies, current medications, past family history, past medical history, past social history, past surgical history and problem list     Review of Systems    Current Outpatient Medications   Medication Sig Dispense Refill    cyclobenzaprine (FLEXERIL) 5 mg tablet Take 10 mg by mouth as needed for muscle spasms      escitalopram (LEXAPRO) 20 mg tablet Take 20 mg by mouth daily      glucosamine-chondroitin 500-400 MG tablet Take 1 tablet by mouth daily      Green Tea 200 MG CAPS Take by mouth      hydrocortisone-pramoxine (ANALPRAM-HC) 2 5-1 % rectal cream Insert into the rectum 3 (three) times a day 30 g 0    LORazepam (ATIVAN) 1 mg tablet Take 1 mg by mouth daily at bedtime as needed  0    omeprazole (PriLOSEC) 40 MG capsule Take 40 mg by mouth daily      traMADol (ULTRAM) 50 mg tablet Take 50 mg by mouth as needed for moderate pain       No current facility-administered medications for this visit  Objective:    Blood pressure 110/80, pulse 80, temperature 99 °F (37 2 °C), temperature source Tympanic, resp  rate 16, height 5' 2" (1 575 m), weight 84 4 kg (186 lb)    Body mass index is 34 02 kg/m²  Body surface area is 1 85 meters squared  Physical Exam   Constitutional: She is oriented to person, place, and time  She appears well-developed and well-nourished  No distress  Pulmonary/Chest: Effort normal    Neurological: She is alert and oriented to person, place, and time  Skin: She is not diaphoretic  Psychiatric: She has a normal mood and affect  Her behavior is normal  Judgment and thought content normal      Colposcopy  Date/Time: 7/17/2019 3:46 PM  Performed by: Christiano Terry MD  Authorized by: Christiano Terry MD     Consent:     Consent obtained:  Verbal    Consent given by:  Patient  Pre-procedure:     Prepped with: acetic acid    Indication:     Indications: MARGO 3  Procedure:     Procedure: Colposcopy of Vulva only      Biopsy(s): no      Specimen to pathology: no    Post-procedure:     Impression comment:  No evidence of dysplasia or malignancy    Patient tolerance of procedure:   Tolerated well, no immediate complications

## 2019-09-04 DIAGNOSIS — K59.00 CONSTIPATION, UNSPECIFIED CONSTIPATION TYPE: ICD-10-CM

## 2019-09-04 DIAGNOSIS — K31.84 GASTROPARESIS: Primary | ICD-10-CM

## 2019-09-04 RX ORDER — METOCLOPRAMIDE 5 MG/1
5 TABLET ORAL
Qty: 120 TABLET | Refills: 3 | Status: SHIPPED | OUTPATIENT
Start: 2019-09-04

## 2019-09-04 NOTE — TELEPHONE ENCOUNTER
Linzess 145 mcg ordered 3/5/18 one po daily #30 with 3 refills  Reglan 5 mg QID last ordered October 2018 listed as discontinued 1/16/19  Please sign off to release to pharmacy

## 2019-09-04 NOTE — TELEPHONE ENCOUNTER
----- Message from Monica Young sent at 9/3/2019  7:39 PM EDT -----  Regarding: RE:Your Recent Visit  Contact: [phone]  Hi Dr Karla jama,    My hemmoroids are starting to come back I believe  I need a refill please on my reglan and Linzess please  I am in the process of buying a house and moving and running out of these was not smart on my part  THank you   ----- Message -----  From: Joel Schmitt MD  Sent: 5/15/2019  1:07 PM EDT  To: Mariajose Taylor  Subject: Your Recent Visit  Mariajose Taylor, you have a new After Visit Summary in [address] [place] [address]  Please click on visits and then your most recent appointment, to view your After Visit Summary  If you are experiencing any technical issues please call our patient service desk at [phone]) option [phone]

## 2019-09-18 ENCOUNTER — TELEPHONE (OUTPATIENT)
Dept: GASTROENTEROLOGY | Facility: CLINIC | Age: 49
End: 2019-09-18

## 2019-09-18 NOTE — TELEPHONE ENCOUNTER
----- Message from Jaylen Paulson sent at 9/18/2019 11:13 AM EDT -----  Regarding: RE:Your Recent Visit  Contact: [phone]  I called the office  They said you can not even treat me I need to see a surgeon  Wish I would have known this sooner  thank you   ----- Message -----  From: Cely Faye  Sent: 9/18/2019 11:08 AM EDT  To: Arnav Hampton  Subject: RE:Your Recent Visit  Deniz Jose -  Dr Camille Bailey is not available today  She is here again tomorrow so I will send your message to her for a reply on Thursday  If you need more immediate attention please call the office  Roberto Bsos      ----- Message -----     From: Arnav Hampton     Sent: 9/18/2019 11:02 AM EDT       To: Pillo Mccoy MD  Subject: RE:Your Recent Visit    Dr Camille Bailey,    This is a thrombosed hemorrhoid  It is not going away even with soft stool  Its been a month  I believe it needs to be drained  ----- Message -----  From: Pillo Mccoy MD  Sent: 9/16/2019  3:47 PM EDT  To: Arnav Hampton  Subject: RE:Your Recent Visit  Dear Ms Brett Paulson,    Generally, we band the internal hemorrhoids with the idea that as the hemorrhoids shrink internally, the scarring pulls the external hemorrhoids back in  Dr Camille Bailey      ----- Message -----     From: Arnav Hampton     Sent: 9/16/2019 12:45 PM EDT       To: Pillo Mccoy MD  Subject: RE:Your Recent Visit    Can you band eternal ones? There is nothing I can do in the meantime I will not be able to get in to see you right away    Peter Danielle I have had this for weeks now    Thank you   ----- Message -----  From: Pillo Mccoy MD  Sent: 9/16/2019 12:38 PM EDT  To: Arnav Hampton  Subject: RE:Your Recent Visit  Hi Ms Brett Paulson,    We can schedule a repeat hemorrhoid banding series to see if it helps  Please call the office to schedule at your earliest convenience      Dr Camille Bailey        ----- Message -----     From: Arnav Hampton     Sent: 9/15/2019 10:57 AM EDT       To: Pillo Mccoy MD  Subject: RE:Your Recent Visit    Hi Dr Yudy Zuniga,   I have an external hemmoroid that wont go away    Sherre Soulier ACV, Emuaid, Sitz baths, OTC meds   it has shrunk but is still there and making my life very uncomfortable  I sit for work 8 hours a day   i try to get up as often as I can but this is just not going away  I am getting ready to do an 8 hour drive on Friday and I am not looking forward to it at all  Please help  The reglan, Linzess help the stools I have also found out I need to lift my legs during a bowel movement to make it easier to pass   it is not hard stool but none the less due to this hemmoroid every bowel movement puts me in tears  ----- Message -----  From: Zhang Gtz MD  Sent: 5/15/2019  1:07 PM EDT  To: Giovanna Steiner  Subject: Your Recent Visit  Giovanna Steiner, you have a new After Visit Summary in [address] [place] [address]  Please click on visits and then your most recent appointment, to view your After Visit Summary  If you are experiencing any technical issues please call our patient service desk at [phone] ([phone]) option [phone]

## 2019-09-18 NOTE — TELEPHONE ENCOUNTER
I received patient's [employer] message that she had called the office  I spoke with the patient now and she called our office and was told we would not treat that here and she was given a phone # for surgeon and she has appointment tomorrow with surgeon

## 2019-10-02 ENCOUNTER — DOCUMENTATION (OUTPATIENT)
Dept: GASTROENTEROLOGY | Facility: CLINIC | Age: 49
End: 2019-10-02

## 2019-10-03 ENCOUNTER — TELEPHONE (OUTPATIENT)
Dept: GASTROENTEROLOGY | Facility: CLINIC | Age: 49
End: 2019-10-03

## 2019-10-03 NOTE — TELEPHONE ENCOUNTER
Spoke to patient  She states that she also needs alternate positions like lying on her side and or prone and can still work on her lap top that way and also use ice, sitz baths, etc  In between  Della Tineo RN and I reviewed Dr Chad Zamora note and I then called patient and recommended she contact them to be seen  When I recommended this to the patient she stated she called them this morning and they said sitting does not cause hemorrhoids  I called Dr Chad Zamora office and Zhou Desai looked up the note from patient's call to their office today  States the patient called for a note that sitting affects her hemorrhoids and Dr Chad Zamora response was that if she was having pain she should be seen to be re evaluated for surgery

## 2019-10-03 NOTE — LETTER
October 3, 2019        To Whom It May Concern:    Hattie Campos  ( 1970) is currently being treated for a medical condition that may be aggravated by sitting  To promote healing it would be advised that her work hours daily are split between her place of employment and home  The anticipated date for recovery would be six weeks  If you have any other questions, please feel free to contact us      Sincerely,    Jared Cook MD      GS/ldf

## 2019-10-03 NOTE — TELEPHONE ENCOUNTER
----- Message from Jennifer Burleson MD sent at 10/3/2019 11:34 AM EDT -----  Regarding: FW: Non-Urgent Medical Question  Contact: [phone]  Can we get a letter to the patient stating that given her current medical issue, it would be advised that she splits her day or work partly from home while the issue is being resolved  Expected time of healing would be about 6 weeks  Thank you  Vijaya Mejia    My reply to pt was as below:    Hi Ms Dayanara Steele,    For hemorrhoids, we can definitely write a letter stating it'd be advised that you split your day  Please let us know where to send the letter  I don't think we can write disability letters for hemorrhoids  However, the hemorrhoids need to be in active treatment  I recommend asking Dr Win Ruby office for more definitive surgery given the large hemorrhoids  In the meantime, you can use preparation H 4 times a day  Thank you  Dr Mera Callahan  ----- Message -----  From: Jesus Degroot RN  Sent: 10/2/2019   5:28 PM EDT  To: Jennifer Burleson MD  Subject: FW: Non-Urgent Medical Question                      ----- Message -----  From: Rachel Schaffer  Sent: 10/2/2019   6:19 AM EDT  To: , #  Subject: Non-Urgent Medical Question                      Dr Mera Callahan,    I saw Dr Nell Martinez on 9/19  The thrombosed hemorrhoid was too far gone and on the healing end by the time I saw him  I was able to to sit hardly at all for 10 days     well it finally cleared however I am back at work and after just 2 days it is starting to return  I sit all day   i have requested work from home ad I can make myself more comfortable or disability until these heals completely  I would prefer the work from home as I certainly dont want to cost the company money and I can do my job just not in this position all day  I am in constant pain  Please chime in on this with me please?   Can I get a note that says I can not sit all day for 8 hours due to this and hopefull they may let me split my day or do a few days in office a few days from home or if they arent willing disability   this is literally ruining my well being and all around health, but I also need to work      I would have brought Dr Fanny Jacobsen in on this but I didnt have the option  Thank you

## 2019-10-03 NOTE — TELEPHONE ENCOUNTER
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

## 2019-10-03 NOTE — TELEPHONE ENCOUNTER
Spoke with Lore Winn at [employer]  She states that they do not view working at home in a reclining position as a proper work environment and then can offer sit/standing at the work place

## 2019-10-03 NOTE — TELEPHONE ENCOUNTER
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

## 2020-07-17 ENCOUNTER — TELEPHONE (OUTPATIENT)
Dept: GYNECOLOGIC ONCOLOGY | Facility: CLINIC | Age: 50
End: 2020-07-17

## 2020-07-22 ENCOUNTER — OFFICE VISIT (OUTPATIENT)
Dept: GYNECOLOGIC ONCOLOGY | Facility: HOSPITAL | Age: 50
End: 2020-07-22
Payer: COMMERCIAL

## 2020-07-22 VITALS
HEART RATE: 92 BPM | HEIGHT: 62 IN | WEIGHT: 194 LBS | SYSTOLIC BLOOD PRESSURE: 120 MMHG | DIASTOLIC BLOOD PRESSURE: 72 MMHG | BODY MASS INDEX: 35.7 KG/M2

## 2020-07-22 DIAGNOSIS — D07.1 VIN III (VULVAR INTRAEPITHELIAL NEOPLASIA III): Primary | ICD-10-CM

## 2020-07-22 PROCEDURE — 56820 COLPOSCOPY VULVA: CPT | Performed by: OBSTETRICS & GYNECOLOGY

## 2020-07-22 NOTE — PROGRESS NOTES
Colposcopy  Date/Time: 7/22/2020 2:59 PM  Performed by: Aimee Cruz MD  Authorized by: Aimee Cruz MD     Consent:     Consent obtained:  Verbal    Consent given by:  Patient  Pre-procedure:     Prepped with: acetic acid    Indication:     Indications: MARGO 3  Procedure:     Procedure: Colposcopy of Vulva only      Biopsy(s): no      Specimen to pathology: no    Post-procedure:     Findings comment:  No dysplasia/malignancy    Patient tolerance of procedure: Tolerated well, no immediate complications      [id number] with a history of severe vulvar dysplasia  She completed aldara therapy in August of 2018  Colposcopy today did not reveal any evidence of dysplasia or malignancy  Her performance status is 0   1  She will continue to follow up with her gynecologist as scheduled  She was encouraged to call the office with any symptoms

## 2020-07-22 NOTE — LETTER
July 22, 2020     [address]    Patient: Katelin Gallegos   YOB: 1970   Date of Visit: 7/22/2020       Dear Dr Nagi Steinberg: Thank you for referring Tyrell Gomez to me for evaluation  Below are my notes for this consultation  If you have questions, please do not hesitate to call me  I look forward to following your patient along with you  Sincerely,        Kyler Sinclair MD        CC: MD Kyler Walker MD  7/22/2020  3:00 PM  Sign at close encounter  Colposcopy  Date/Time: 7/22/2020 2:59 PM  Performed by: Kyler Sinclair MD  Authorized by: Kyler Sinclair MD     Consent:     Consent obtained:  Verbal    Consent given by:  Patient  Pre-procedure:     Prepped with: acetic acid    Indication:     Indications: MARGO 3  Procedure:     Procedure: Colposcopy of Vulva only      Biopsy(s): no      Specimen to pathology: no    Post-procedure:     Findings comment:  No dysplasia/malignancy    Patient tolerance of procedure: Tolerated well, no immediate complications      [id number] with a history of severe vulvar dysplasia  She completed aldara therapy in August of 2018  Colposcopy today did not reveal any evidence of dysplasia or malignancy  Her performance status is 0   1  She will continue to follow up with her gynecologist as scheduled  She was encouraged to call the office with any symptoms

## 2020-07-22 NOTE — ASSESSMENT & PLAN NOTE
75-year-old with a history of severe vulvar dysplasia  Colposcopy today did not reveal any evidence of dysplasia or malignancy  Her performance status is 0   1  She will continue to follow up with her gynecologist as scheduled  She was encouraged to call the office with any symptoms

## 2021-06-14 ENCOUNTER — TELEPHONE (OUTPATIENT)
Dept: GASTROENTEROLOGY | Facility: CLINIC | Age: 51
End: 2021-06-14

## 2021-06-14 NOTE — TELEPHONE ENCOUNTER
Took call from patient in regards to receiving recall colon letter  She is questioning if she should have another EGD as well at the same time  Last colon/egd in 2016, no egd recall noted  History of gastroparesis, denies current symptoms- only gerd  Please advise, thank you

## 2021-07-08 VITALS — HEIGHT: 63 IN | WEIGHT: 185 LBS | BODY MASS INDEX: 32.78 KG/M2

## 2021-07-08 DIAGNOSIS — K59.00 CONSTIPATION, UNSPECIFIED CONSTIPATION TYPE: Primary | ICD-10-CM

## 2021-07-08 RX ORDER — VENLAFAXINE HYDROCHLORIDE 75 MG/1
75 CAPSULE, EXTENDED RELEASE ORAL DAILY
COMMUNITY

## 2021-07-08 RX ORDER — SEMAGLUTIDE 1.34 MG/ML
0.25 INJECTION, SOLUTION SUBCUTANEOUS
COMMUNITY

## 2021-07-08 RX ORDER — SODIUM PICOSULFATE, MAGNESIUM OXIDE, AND ANHYDROUS CITRIC ACID 10; 3.5; 12 MG/160ML; G/160ML; G/160ML
LIQUID ORAL
Qty: 320 ML | Refills: 0 | Status: SHIPPED | OUTPATIENT
Start: 2021-07-08

## 2021-07-08 NOTE — TELEPHONE ENCOUNTER
Dr Katey Liu you are doing a colon on 7/19/21 Pt states her PCP would like her to have an EGD as well due to burning in her throat  Can that be added or does pt need ov ?  Please advise

## 2021-07-08 NOTE — TELEPHONE ENCOUNTER
Why does your doctor want you to have this procedure? HISTORY OF POLYP    Do you have kidney disease?  no  If yes, are you on dialysis :     Have you had diverticulitis within the past 2 months? no    Are you diabetic?  yes  If yes, insulin dependent: If yes, provide diabetic instructions sheet     Do take iron supplements?  no  If yes, instruct patient to hold iron supplement for 7 days prior    Are you on a blood thinner? no   Was the blood thinner sheet complete and faxed to cardiologist no  Plavix (clopidogrel), Coumadin (warfarin), Lovenox (enoxaparin), Xarelto (rivaroxaban), Pradaxa(dabigatran), Eliquis(apixaban) Savaysa/Lixiana (edoxapan)    Do you have an automatic implantable cardiac defibrillator (AICD)/pacemaker ([facility])? no  Was AICD/pacemaker sheet completed and faxed to cardiologist? no    Are you on home oxygen? no  If yes, continuous or nocturnal:     Have you been treated for MRSA, VRE or any communicable diseases? no    Heart attack, stroke, or stent within 3 months? no  Schedule at Hospital if within 3-6 months   Use nitroglycerin for chest pain in the last 6 months? no    History of organ  transplant?  no   If yes, notify Endo      History of neck/throat/tongue surgery or cancer? no  IF yes, notify Endo      Any problems with anesthesia in the past? VOMITING     Was stool C diff ordered?  no Stool specimen needs to be completed prior to procedure    Do have any facial or body piercings?no     Do you have a latex allergy? no     Do have an allergy to metals? (Bravo study only) no     If pediatric patient, was consent faxed to pediatrician no     Patient rights reviewed yes       Rx Clenpiq sent to provider for signature  Instructions emailed to patient

## 2021-07-15 NOTE — TELEPHONE ENCOUNTER
Dr Tomasa Rios is insisting on sched combo-due for recall-pt states Dr Lesia Bower said she should have an egd due to epigastric pain  OV notes from Dr Lesia Bower are scanned in for your review  Sched just colon or combo-or sched ov   Please advise-(pt had already asked about sched egd but was told no previously)

## 2021-07-28 ENCOUNTER — ANESTHESIA (OUTPATIENT)
Dept: GASTROENTEROLOGY | Facility: AMBULATORY SURGERY CENTER | Age: 51
End: 2021-07-28

## 2021-07-28 ENCOUNTER — ANESTHESIA EVENT (OUTPATIENT)
Dept: GASTROENTEROLOGY | Facility: AMBULATORY SURGERY CENTER | Age: 51
End: 2021-07-28

## 2021-07-28 ENCOUNTER — HOSPITAL ENCOUNTER (OUTPATIENT)
Dept: GASTROENTEROLOGY | Facility: AMBULATORY SURGERY CENTER | Age: 51
Discharge: HOME | End: 2021-07-28
Payer: COMMERCIAL

## 2021-07-28 VITALS
SYSTOLIC BLOOD PRESSURE: 102 MMHG | OXYGEN SATURATION: 97 % | DIASTOLIC BLOOD PRESSURE: 65 MMHG | HEART RATE: 70 BPM | RESPIRATION RATE: 19 BRPM | TEMPERATURE: 98.3 F

## 2021-07-28 DIAGNOSIS — Z86.010 HISTORY OF COLON POLYPS: ICD-10-CM

## 2021-07-28 DIAGNOSIS — K21.9 GASTROESOPHAGEAL REFLUX DISEASE, UNSPECIFIED WHETHER ESOPHAGITIS PRESENT: ICD-10-CM

## 2021-07-28 PROCEDURE — 45380 COLONOSCOPY AND BIOPSY: CPT | Performed by: INTERNAL MEDICINE

## 2021-07-28 PROCEDURE — 88305 TISSUE EXAM BY PATHOLOGIST: CPT | Performed by: PATHOLOGY

## 2021-07-28 PROCEDURE — 45381 COLONOSCOPY SUBMUCOUS NJX: CPT | Performed by: INTERNAL MEDICINE

## 2021-07-28 PROCEDURE — 43235 EGD DIAGNOSTIC BRUSH WASH: CPT | Performed by: INTERNAL MEDICINE

## 2021-07-28 RX ORDER — SODIUM CHLORIDE, SODIUM LACTATE, POTASSIUM CHLORIDE, CALCIUM CHLORIDE 600; 310; 30; 20 MG/100ML; MG/100ML; MG/100ML; MG/100ML
50 INJECTION, SOLUTION INTRAVENOUS CONTINUOUS
Status: DISCONTINUED | OUTPATIENT
Start: 2021-07-28 | End: 2021-07-28

## 2021-07-28 RX ORDER — PROPOFOL 10 MG/ML
INJECTION, EMULSION INTRAVENOUS AS NEEDED
Status: DISCONTINUED | OUTPATIENT
Start: 2021-07-28 | End: 2021-07-28

## 2021-07-28 RX ADMIN — PROPOFOL 100 MG: 10 INJECTION, EMULSION INTRAVENOUS at 13:13

## 2021-07-28 RX ADMIN — PROPOFOL 40 MG: 10 INJECTION, EMULSION INTRAVENOUS at 13:17

## 2021-07-28 RX ADMIN — PROPOFOL 50 MG: 10 INJECTION, EMULSION INTRAVENOUS at 13:43

## 2021-07-28 RX ADMIN — PROPOFOL 60 MG: 10 INJECTION, EMULSION INTRAVENOUS at 13:21

## 2021-07-28 RX ADMIN — SODIUM CHLORIDE, SODIUM LACTATE, POTASSIUM CHLORIDE, CALCIUM CHLORIDE 50 ML/HR: 600; 310; 30; 20 INJECTION, SOLUTION INTRAVENOUS at 13:01

## 2021-07-28 RX ADMIN — PROPOFOL 60 MG: 10 INJECTION, EMULSION INTRAVENOUS at 13:26

## 2021-07-28 RX ADMIN — PROPOFOL 50 MG: 10 INJECTION, EMULSION INTRAVENOUS at 13:48

## 2021-07-28 RX ADMIN — PROPOFOL 60 MG: 10 INJECTION, EMULSION INTRAVENOUS at 13:31

## 2021-07-28 RX ADMIN — PROPOFOL 30 MG: 10 INJECTION, EMULSION INTRAVENOUS at 13:36

## 2021-07-28 RX ADMIN — PROPOFOL 50 MG: 10 INJECTION, EMULSION INTRAVENOUS at 13:40

## 2021-07-28 RX ADMIN — SODIUM CHLORIDE, SODIUM LACTATE, POTASSIUM CHLORIDE, CALCIUM CHLORIDE: 600; 310; 30; 20 INJECTION, SOLUTION INTRAVENOUS at 13:51

## 2021-07-28 NOTE — DISCHARGE INSTRUCTIONS
Gastroparesis   WHAT YOU NEED TO KNOW:   What is gastroparesis? Gastroparesis is a condition that causes food to move more slowly than normal from the stomach to the intestines  Gastroparesis is not caused by blockage  Often, the cause may not be known  It may be caused by damage to a nerve that controls muscles used to move food to your small intestines  What puts me at risk for gastroparesis? · Diabetes for 10 years or more    · Hypothyroidism     · Gastric or antireflux surgery    · GERD    · Past viral infection     · Medicines such as narcotics and some medicines used to treat diabetes    · Conditions that affect your nervous system, such as Parkinson disease    · Conditions that affect your connective tissue, such as scleroderma    What are the signs and symptoms of gastroparesis? · Nausea and vomiting    · Feeling full sooner than normal or after eating less than usual    · Abdominal bloating and pain    · Weight loss or poor nutrition     · Frequent changes in blood sugar    How is gastroparesis diagnosed? You may need any of the following tests:  · Blood tests  will show your blood counts and electrolyte (mineral) levels  · Gastric emptying scintigraphy (GES)  measures how quickly food moves through your stomach and into your intestine  A material is put into scrambled eggs and the eggs are eaten  Pictures of the material are taken as it travels through the stomach and into the small intestine  · A SmartPill test  may be done to measure changes in pH and pressure in the gastrointestinal (GI) tract  You will need to swallow a capsule that transmits radio waves and records the measurements  · A breath test  is done by eating eggs with carbon in them  Breath samples are taken over a period of hours  The amount of carbon is measured in exhaled air  This shows how fast the stomach is emptying  · An endoscopy  may be done to find problems in the esophagus or stomach   A small, thin tube with a camera and a light will be inserted into your esophagus and stomach  · An x-ray or CT scan  may show problems in your stomach  You may be given contrast liquid to help your stomach show up better in the pictures  Tell the healthcare provider if you have ever had an allergic reaction to contrast liquid  How is gastroparesis treated? · Medicines  may be given to control your nausea and vomiting  You may receive medicines that help food move through your stomach at a more normal rate  · Nutrition support  may be given as liquid supplements  You may need to see a dietitian for help with a nutrition plan  · Gastric electrical stimulation (GES)  sends electrical pulses to the nerves in your stomach to help food move through to your intestines  It may also help control nausea and vomiting  GES is done when symptoms cannot be controlled with other treatments  · Surgery  may be needed if other treatments do not work  You may need surgery to place a feeding tube through your skin and into your small intestine  What else can I do to manage gastroparesis? Your healthcare provider may suggest any of the following:  · Change your eating habits  Take small bites of food to make it easier for your body to digest  You may need to eat several small meals low in fiber and fat throughout the day  Ask your dietitian for help with planning meals  · Do not eat raw fruits, vegetables, or whole grains  These can cause you to have undigested food in your stomach  The undigested food can form a blockage that can become life-threatening  · Drink liquids as directed  Liquids will prevent dehydration caused by vomiting  Slowly drink small amounts of liquids at a time  Ask your healthcare provider how much liquid to drink each day, and which liquids are best for you  You may also need to drink an oral rehydration solution (ORS)   An ORS has the right amounts of sugar, salt, and minerals in water to replace body fluids  · Do not lie down for 2 hours after your meals  Walking and sitting after meals help with digestion  · Control your blood sugar levels if you have diabetes  High blood sugar levels may make your symptoms worse  Ask your healthcare provider how to control your blood sugar levels  You or someone else should call 911 for any of the following:   · Your heart is beating faster and you are breathing faster than usual     · You cannot be woken up  When should I seek immediate care? · You are confused or have trouble thinking clearly  · You are dizzy or very drowsy  When should I contact my healthcare provider? · You are urinating less than usual      · Your symptoms return or become worse  · The color of your urine is dark yellow  · You have questions or concerns about your condition or care  CARE AGREEMENT:   You have the right to help plan your care  Learn about your health condition and how it may be treated  Discuss treatment options with your healthcare providers to decide what care you want to receive  You always have the right to refuse treatment  The above information is an  only  It is not intended as medical advice for individual conditions or treatments  Talk to your doctor, nurse or pharmacist before following any medical regimen to see if it is safe and effective for you  © Copyright [employer] 2021 Information is for End User's use only and may not be sold, redistributed or otherwise used for commercial purposes  All illustrations and images included in CareNotes® are the copyrighted property of A D A M , Inc  or [employer] M [employer]  Colorectal Polyps   WHAT YOU NEED TO KNOW:   What are colorectal polyps? Colorectal polyps are small growths of tissue in the lining of the colon and rectum  Most polyps are usually benign (not cancer)  Certain types of polyps, called adenomatous polyps, may turn into cancer       What increases my risk for colorectal polyps? The exact cause of colorectal polyps is unknown  The following may increase your risk:  · Older age    · Foods high in fat and low in fiber    · Family history of polyps    · Intestinal diseases, such as Crohn disease or ulcerative colitis    · Smoking cigarettes or drinking alcohol    · Lack of physical activity, such as exercise    · Obesity    What are the signs and symptoms of colorectal polyps? · Blood in your bowel movement or bleeding from the rectum    · Change in bowel movement habits, such as diarrhea or constipation    · Abdominal pain    How are colorectal polyps diagnosed? You should have fecal blood screening 1 time each year for colorectal disease if you are older than 50 years  You should be screened earlier if you have an intestinal disease or a family history of polyps or colorectal cancer  During this screening, a sample of your bowel movement is checked for blood, which may be an early sign of colorectal polyps or cancer  You may also need any of the following tests:  · A digital rectal exam  means your healthcare provider will use a finger to check for polyps  · A barium enema  is an x-ray of the colon  A tube is put into your anus, and a liquid called barium is put through the tube  Barium is used so that healthcare providers can see your colon better on the x-ray film  · A virtual colonoscopy  is a CT scan that takes pictures of the inside of your colon and rectum  A small, flexible tube is put into your rectum and air or carbon dioxide (gas) is used to expand your colon  This lets healthcare providers clearly see your colon and any polyps on a monitor  · Colonoscopy or sigmoidoscopy  are procedures to help your healthcare provider see the inside of your colon  He or she will use a flexible tube with a small light and camera on the end  During a sigmoidoscopy, your healthcare provider will only look at rectum and lower colon   During a colonoscopy, he or she will look at the full length of your colon  A small amount of tissue may be removed from your colon for tests  How are colorectal polyps treated? Small, benign polyps may not need treatment  Your healthcare provider will check the polyp over time to make sure it is not changing  Polyps that are cancer may be removed with one of the following:  · A polypectomy  is a minimally invasive procedure to remove a polyp during a colonoscopy or sigmoidoscopy  Your healthcare provider may need to remove the polyp with a laparoscope  Laparoscopy is done by inserting a small, flexible scope into incisions made on your abdomen  · Surgery  may be needed to remove large or deep polyps that cannot be removed in a polypectomy  Tissues or lymph nodes near a polyp may also be removed  This helps stop cancer from spreading  What can I do to lower my risk for colorectal polyps? · Eat a variety of healthy foods  Healthy foods include fruit, vegetables, whole-grain breads, low-fat dairy products, beans, lean meat, and fish  Ask if you need to be on a special diet  · Maintain a healthy weight  Ask your healthcare provider what a healthy weight is for you  Ask him or her to help with a weight loss plan if you are overweight  · Exercise as directed  Begin to exercise slowly and do more as you get stronger  Talk with your healthcare provider before you start an exercise program          · Limit alcohol  Your risk for polyps increases the more you drink  · Do not smoke  Nicotine and other chemicals in cigarettes and cigars increase your risk for polyps  Ask your healthcare provider for information if you currently smoke and need help to quit  E-cigarettes or smokeless tobacco still contain nicotine  Talk to your healthcare provider before you use these products  Where can I find more information?    · [address] ([facility])  [address] [place] [address]  Phone: 0- [phone] - [phone]  Web Address: www digestive  [facility]    Call your local emergency number (911 in the [address]) if:   · You have sudden shortness of breath  · You have a fast heart rate, fast breathing, or are too dizzy to stand up  When should I seek immediate care? · You have severe abdominal pain  · You see blood in your bowel movement  When should I call my doctor? · You have a fever  · You have chills, a cough, or feel weak and achy  · You have abdominal pain that does not go away or gets worse after you take medicine  · Your abdomen is swollen  · You are losing weight without trying  · You have questions or concerns about your condition or care  CARE AGREEMENT:   You have the right to help plan your care  Learn about your health condition and how it may be treated  Discuss treatment options with your healthcare providers to decide what care you want to receive  You always have the right to refuse treatment  The above information is an  only  It is not intended as medical advice for individual conditions or treatments  Talk to your doctor, nurse or pharmacist before following any medical regimen to see if it is safe and effective for you  © Copyright [employer] 2021 Information is for End User's use only and may not be sold, redistributed or otherwise used for commercial purposes  All illustrations and images included in CareNotes® are the copyrighted property of [employer]  or [address] [place] [address] [employer]   Upper Endoscopy and Colonoscopy   WHAT YOU NEED TO KNOW:   An upper endoscopy is also called an upper gastrointestinal (GI) endoscopy, or an esophagogastroduodenoscopy (EGD)  It is a procedure to examine the inside of your esophagus, stomach, and duodenum (first part of the small intestine) with a scope  You may feel bloated, gassy, or have some abdominal discomfort after your procedure  Your throat may be sore for 24 to 36 hours   You may burp or pass gas from air that is still inside your body  A colonoscopy is a procedure to examine the inside of your colon (intestine) with a scope  Polyps or tissue growths may have been removed during your colonoscopy  It is normal to feel bloated and to have some abdominal discomfort  You should be passing gas  If you have hemorrhoids or you had polyps removed, you may have a small amount of bleeding  DISCHARGE INSTRUCTIONS:   Seek care immediately if:   · You have sudden, severe abdominal pain  · You have problems swallowing  · You have a large amount of black, sticky bowel movements or blood in your bowel movements  · You have sudden trouble breathing  · You feel weak, lightheaded, or faint or your heart beats faster than normal for you  Contact your healthcare provider if:   · You have a fever and chills  · You have nausea or are vomiting  · Your abdomen is bloated or feels full and hard  · You have abdominal pain  · You have a large amount of black, sticky bowel movements or blood in your bowel movements  · You have not had a bowel movement for 3 days after your procedure  · You have rash or hives  · You have questions or concerns about your procedure  Activity:   ·       Do not lift, strain, or run for 24 hours after your procedure  ·       Rest after your procedure  You have been given medicine to relax you  Do not drive or make important decisions until the day after your procedure  Return to your normal activity as directed  ·       Relieve gas and discomfort from bloating by lying on your right side with a heating pad on your abdomen  You may need to take short walks to help the gas move out  Eat small meals until bloating is relieved  Follow up with your healthcare provider as directed: Write down your questions so you remember to ask them during your visits        If you take a blood thinner, please review the specific instructions from your endoscopist about when you should resume it  These can be found in the Recommendation and Your Medication list sections of this After Visit Summary

## 2021-07-28 NOTE — ANESTHESIA PREPROCEDURE EVALUATION
Procedure:  COLONOSCOPY  EGD    Relevant Problems   GI/HEPATIC   (+) Rectal bleeding      NEURO/PSYCH   (+) Anxiety      PULMONARY   (+) Smoker      Other   (+) Gastroparesis        Physical Exam    Airway    Mallampati score: II  TM Distance: >3 FB  Neck ROM: full     Dental   No notable dental hx     Cardiovascular  Cardiovascular exam normal    Pulmonary  Pulmonary exam normal     Other Findings        Anesthesia Plan  ASA Score- 2     Anesthesia Type- IV sedation with anesthesia with ASA Monitors  Additional Monitors:   Airway Plan:           Plan Factors-    Chart reviewed  Patient is a current smoker  Patient instructed to abstain from smoking on day of procedure  Patient smoked on day of surgery  Induction- intravenous  Postoperative Plan-     Informed Consent- Anesthetic plan and risks discussed with patient  I personally reviewed this patient with the CRNA  Discussed and agreed on the Anesthesia Plan with the CRNA  Tammy Bettencourt

## 2021-07-28 NOTE — SEDATION DOCUMENTATION
3cc of spot injected near the polyp site at 23cm in the colon  See physician's record  Grounding pad placed on right thigh  Skin intact pre and post removal of the grounding pad

## 2021-07-28 NOTE — H&P
History and Physical - [facility]  Tanja Love 46 y o  female MRN: [id number]    HPI: Tanja Love is a 46y o  year old female who presents for EGD colonoscopy for acid reflux gastroparesis and history of polyps  REVIEW OF SYSTEMS: Per the HPI, and otherwise unremarkable      Historical Information   Past Medical History:   Diagnosis Date    Anxiety     Colon polyps     Gastroparesis      Past Surgical History:   Procedure Laterality Date    BUNIONECTOMY       SECTION      CHOLECYSTECTOMY      COLONOSCOPY      ENDOMETRIAL ABLATION N/A      Social History   Social History     Substance and Sexual Activity   Alcohol Use Yes     Social History     Substance and Sexual Activity   Drug Use No     Social History     Tobacco Use   Smoking Status Current Every Day Smoker    Packs/day: 0 25    Types: Cigarettes   Smokeless Tobacco Never Used     Family History   Problem Relation Age of Onset    Cervical cancer Mother     Vaginal cancer Mother     Colon cancer Neg Hx     Colon polyps Neg Hx        Meds/Allergies       Current Outpatient Medications:     cyclobenzaprine (FLEXERIL) 5 mg tablet    LORazepam (ATIVAN) 1 mg tablet    omeprazole (PriLOSEC) 40 MG capsule    Semaglutide,0 25 or 0 5MG/DOS, (Ozempic, 0 25 or 0 5 MG/DOSE,) 2 MG/1 5ML SOPN    Sod Picosulfate-Mag Ox-Cit Acd (Clenpiq) 10-3 5-12 MG-GM -GM/160ML SOLN    traMADol (ULTRAM) 50 mg tablet    venlafaxine (EFFEXOR-XR) 75 mg 24 hr capsule    escitalopram (LEXAPRO) 20 mg tablet    glucosamine-chondroitin 500-400 MG tablet    Green Tea 200 MG CAPS    hydrocortisone-pramoxine (ANALPRAM-HC) 2 5-1 % rectal cream    linaCLOtide (LINZESS) 145 MCG CAPS    metoclopramide (REGLAN) 5 mg tablet    Current Facility-Administered Medications:     lactated ringers infusion, 50 mL/hr, Intravenous, Continuous, New Bag at 21 1351    No Known Allergies    Objective     /74   Pulse 85   Temp 98 3 °F (36 8 °C) (Temporal)   Resp 16   LMP 07/12/2021   SpO2 100%   Breastfeeding No     PHYSICAL EXAM    Gen: NAD AAOx3  Head: Normocephalic, Atraumatic  CV: S1S2 RRR no m/r/g  CHEST: Clear b/l no c/r/w  ABD: soft, +BS NT/ND  EXT: no edema    ASSESSMENT/PLAN:  This is a 46y o  year old female here for EGD colonoscopy, and she is stable and optimized for her procedure

## 2021-07-28 NOTE — ANESTHESIA POSTPROCEDURE EVALUATION
Post-Op Assessment Note    CV Status:  Stable  Pain Score: 0    Pain management: adequate     Mental Status:  Alert   PONV Controlled:  None   Airway Patency:  Patent      Post Op Vitals Reviewed: Yes      Staff: CRNA         No complications documented      BP   105/70   Temp      Pulse  73   Resp   14   SpO2   96

## 2024-02-21 PROBLEM — Z12.11 SCREENING FOR COLON CANCER: Status: RESOLVED | Noted: 2019-03-12 | Resolved: 2024-02-21

## 2024-07-08 LAB — HBA1C MFR BLD HPLC: 5.7 %

## 2025-02-13 ENCOUNTER — OFFICE VISIT (OUTPATIENT)
Dept: GASTROENTEROLOGY | Facility: CLINIC | Age: 55
End: 2025-02-13
Payer: COMMERCIAL

## 2025-02-13 VITALS
DIASTOLIC BLOOD PRESSURE: 78 MMHG | SYSTOLIC BLOOD PRESSURE: 114 MMHG | WEIGHT: 156 LBS | BODY MASS INDEX: 29.45 KG/M2 | HEIGHT: 61 IN

## 2025-02-13 DIAGNOSIS — K31.84 GASTROPARESIS: ICD-10-CM

## 2025-02-13 DIAGNOSIS — Z86.0100 HISTORY OF COLON POLYPS: Primary | ICD-10-CM

## 2025-02-13 DIAGNOSIS — K21.9 GASTROESOPHAGEAL REFLUX DISEASE, UNSPECIFIED WHETHER ESOPHAGITIS PRESENT: ICD-10-CM

## 2025-02-13 PROCEDURE — 99204 OFFICE O/P NEW MOD 45 MIN: CPT | Performed by: NURSE PRACTITIONER

## 2025-02-13 RX ORDER — ROSUVASTATIN CALCIUM 5 MG/1
5 TABLET, COATED ORAL DAILY
COMMUNITY

## 2025-02-13 RX ORDER — POLYETHYLENE GLYCOL 3350 17 G/17G
238 POWDER, FOR SOLUTION ORAL ONCE
Qty: 238 G | Refills: 0 | Status: SHIPPED | OUTPATIENT
Start: 2025-02-13 | End: 2025-02-13

## 2025-02-14 NOTE — PROGRESS NOTES
Name: Geovanna Najera      : 1970      MRN: [id number]  Encounter Provider: AWILDA Dorsey  Encounter Date: 2025   Encounter department: [facility]  :  Assessment & Plan  Gastroesophageal reflux disease, unspecified whether esophagitis present  Patient states her acid reflux symptoms are well-controlled as long as she is taking her medications.  She states however that she was taking her medications all at once in the evening which meant she was taking 80 mg of omeprazole at 1 time versus  40 mg twice daily.  Which may have contributed to breakthrough acid reflux symptoms during the day on dietary discretion.   Discussed with patient trialing at least until her EGD of taking the 40 mg prior to breakfast and dinner.  Orders:    EGD; Future    Gastroparesis  Patient states that she was diagnosed many years ago with gastroparesis and was prescribed metoclopramide 5 mg 4 times a day.  Patient states she has not been taking the medication.  She states since her weight loss and dietary changes she feels her symptoms are greatly improved.  Can reevaluate need for resuming medication since patient did tolerate pending results of EGD.       History of colon polyps  Colonoscopy 2021; 1 year recall   Orders:    Colonoscopy; Future    polyethylene glycol (MiraLax) 17 GM/SCOOP powder; Take 238 g by mouth once for 1 dose Take 238 g my mouth. Use as directed    Discussed risks and benefits of having EGD and colonoscopy completed.  Patient understands risks and benefits of procedures and chooses to proceed with scheduling EGD and colonoscopy at [facility].    History of Present Illness   HPI  Geovanna Najera is a 54 y.o. female with past medical history of hemorrhoids, rectal bleeding, gastroparesis and GERD presents for colon consult and complaint of severe GERD.  Patient states that she has been prescribed 80 mg of omeprazole daily and she has been taking it at  "1 time in the evening.  Discussed with patient  the doses and taking it 40 mg twice daily until EGD can be completed.  Patient also due for follow-up colonoscopy.  She had a colonoscopy 7/2021 with noted 20 mm hyperplastic polyp, mild diverticula in sigmoid colon and small internal hemorrhoid and requested for 1 year recall with 2-day prep.  Patient states she was having constipation at that time and she states with dietary changes and loss of weight she is now moving her bowels daily without constipation.  She denies hematochezia or melena.  On exam, patient denies nausea, vomiting or abdominal pain.  She states her acid reflux symptoms are routinely under control with meds and dietary discretion.  Patient states she is less than half a pack a day smoker, social EtOH use.  States her weight is stable.  In discussing patient's weight loss since last office visit.  Patient was 187 pounds in March 2019 and is currently at 156 pounds.  Congratulated patient on her accomplishment.  She denies any family history of colon cancer.      Review of Systems       Objective   /78   Ht 5' 1\" (1.549 m)   Wt 70.8 kg (156 lb)   BMI 29.48 kg/m²      Physical Exam  Vitals and nursing note reviewed.   HENT:      Head: Normocephalic.      Nose: Nose normal.      Mouth/Throat:      Mouth: Mucous membranes are dry.   Eyes:      General: No scleral icterus.     Extraocular Movements: Extraocular movements intact.   Cardiovascular:      Rate and Rhythm: Normal rate and regular rhythm.      Heart sounds: Normal heart sounds.   Pulmonary:      Breath sounds: Normal breath sounds.   Abdominal:      General: Bowel sounds are normal. There is no distension.      Palpations: Abdomen is soft.      Tenderness: There is no abdominal tenderness.   Musculoskeletal:         General: No swelling. Normal range of motion.      Cervical back: Normal range of motion.   Skin:     General: Skin is warm and dry.      Coloration: Skin is not " jaundiced.   Neurological:      Mental Status: She is alert and oriented to person, place, and time.   Psychiatric:         Mood and Affect: Mood normal.

## 2025-02-14 NOTE — ASSESSMENT & PLAN NOTE
Patient states that she was diagnosed many years ago with gastroparesis and was prescribed metoclopramide 5 mg 4 times a day.  Patient states she has not been taking the medication.  She states since her weight loss and dietary changes she feels her symptoms are greatly improved.  Can reevaluate need for resuming medication since patient did tolerate pending results of EGD.

## 2025-02-18 ENCOUNTER — TELEPHONE (OUTPATIENT)
Age: 55
End: 2025-02-18

## 2025-02-18 NOTE — TELEPHONE ENCOUNTER
Scheduled date of EGD/colonoscopy (as of today):Fri 3/28/2025  Physician performing EGD/colonoscopy: Dr Lynn  Location of EGD/colonoscopy:[facility]  Desired bowel prep reviewed with patient: Nadiya / Dulcolax - Patient requesting prep be called to pharmacy  Instructions reviewed with patient by:FW - Sent via [employer]  Clearances:  N/A

## 2025-02-18 NOTE — LETTER
Pablo Austin,        Michael are your prep instructions for your upcoming Colonoscopy / EKG scheduled on Friday 3/28/2025 with Dr. Lynn.  On Thursday 3/27/2025 the GI lab will be calling between 2pm and 6pm with your arrival time for your procedure.   If you have any questions, please feel free to contact our office at [phone].        Address to our location:      [facility]  [address], [place] [address]         Thank you for choosing [facility]!                                               COLONOSCOPY  MIRALAX/Dulcolax Bowel Preparation Instructions    The OR/GI Lab will contact you the evening prior to your procedure with your exact arrival time.    Our practice requires a 1 week notice for any cancellations or rescheduling. We kindly ask that you immediately notify us of any changes including any new medications that are prescribed. Thank you for your cooperation.     WEEK BEFORE YOUR PROCEDURE:  Stop taking Iron tablets.  5 days prior, AVOID vegetables and fruits with skins or seeds, nuts, corn, popcorn and whole grain breads.   Purchase: One (1) 238-gram container of Miralax (polyethylene glycol 3350), four (4) 5 mg Dulcolax (bisacodyl) tablets, and one (1) 64-ounce bottle of Gatorade (sports drink) - no red, orange, or purple. These may be purchased at any pharmacy without a prescription. Generic products are permissible.   Arrange responsible transportation for day of the procedure.     DAY BEFORE THE PROCEDURE:   CLEAR liquids only for entire day prior. Nothing red, orange or purple.    You MAY have:                                                               Soda  Water  Broth Gatorade  Jello  Popsicles Coffee/tea without milk/creamer     YOU MAY NOT HAVE:  Solid foods   Milk and milk products    Juice with pulp    BOWEL PREPARATION:  Includes: One (1) 238-gram container of Miralax  (polyethylene glycol 3350), four (4) 5 mg Dulcolax (bisacodyl) tablets, and one (1) 64-ounce bottle of Gatorade (sports drink).  Preparation may be refrigerated.  Entire bowel prep should be completed.     Afternoon before the procedure (2:00 pm - 5:00 pm):    Take two (2) 5 mg Dulcolax laxative tablets.     Evening before the procedure (6:00 pm):  Mix entire container of Miralax with one (1) 64-ounce bottle of Gatorade and shake until all medication is dissolved.   Begin drinking solution. Drink an eight (8) ounce cup every 10-15 minutes until you have consumed half (32 ounces) of the solution.  Refrigerate remaining solution.    Night before the procedure (8:00 pm):  Take two (2) 5 mg Dulcolax laxative tablets.     Beginning 5 hours before your procedure:  Drink the remaining amount of prepared solution (32 ounces).  Drink an eight (8) ounce cup every 10-15 minutes until you have consumed the remaining solution.     Bowel prep should be completed 4 hours prior to procedure time.    NOTHING TO EAT OR DRINK AFTER MIDNIGHT- EXCEPT FOR YOUR PREP    DAY OF THE PROCEDURE:  You may brush your teeth.  Leave all jewelry at home.  Please arrive for your procedure as indicated by the OR / GI Lab / Endoscopy Unit. The hospital will contact you the day before with your exact arrival time.   Make sure you have arranged ahead of time for a responsible adult (18 or older) to accompany and drive you home after the procedure.  Please discuss any transportation concerns with our staff prior to your procedure.    The effects of the anesthesia can persist for 24 hours.  After receiving the sedation, you must exercise caution before engaging in any activity that could harm yourself and others (such as driving a car).  Do not make any important decisions or do not drink any alcoholic beverages during this time period.  After your procedure, you may have anything you'd like to eat or drink.  You will probably want to start with something  light.  Please include plenty of fluids.  Avoid items that cause gas such as sodas and salads.    SPECIAL INSTRUCTIONS:    For patients currently taking blood thinners and/or antiplatelet therapy our office will contact the prescribing provider.  Our office will contact you with any required changes to your medication regimen.     Blood thinner (i.e. - Coumadin, Pradaxa, Lovenox, Xarelto, Eliquis)  ?  Continue (Do Not Stop)  ? Stop______________for_____________days prior to the procedure.    Antiplatelet (i.e. - Plavix, Aggrenox, Effient, Brilinta)  ?  Continue (Do Not Stop)  ? Stop______________for_____________days prior to the procedure.       Diabetes:   If you are Diabetic, please see separate Diabetic Instruction Sheet.          Prescribed medications:  Do not stop your aspirin, or any of your other medications (unless instructed otherwise).    Take the rest of your prescribed medications with small sips of water at least 2 hours prior to your procedure.                                                                                            For any questions or concerns related to your bowel preparation or pre-procedure instructions, please contact our office at [phone].  Thank you for choosing [facility]!                                             .Mi

## 2025-02-24 DIAGNOSIS — Z86.0100 HISTORY OF COLON POLYPS: Primary | ICD-10-CM

## 2025-02-25 ENCOUNTER — TELEPHONE (OUTPATIENT)
Dept: GASTROENTEROLOGY | Facility: CLINIC | Age: 55
End: 2025-02-25

## 2025-02-25 RX ORDER — POLYETHYLENE GLYCOL 3350 17 G/17G
238 POWDER, FOR SOLUTION ORAL ONCE
Qty: 238 G | Refills: 0 | Status: SHIPPED | OUTPATIENT
Start: 2025-02-25 | End: 2025-02-25

## 2025-02-25 RX ORDER — BISACODYL 5 MG/1
TABLET, DELAYED RELEASE ORAL
Qty: 4 TABLET | Refills: 0 | Status: SHIPPED | OUTPATIENT
Start: 2025-02-25

## 2025-02-25 RX ORDER — POLYETHYLENE GLYCOL 3350 17 G/17G
17 POWDER, FOR SOLUTION ORAL DAILY
Qty: 255 G | Refills: 1 | Status: SHIPPED | OUTPATIENT
Start: 2025-02-25

## 2025-02-25 NOTE — TELEPHONE ENCOUNTER
Miralax and Dulcolax were sent to Geovanna's pharmacy at her request. Additional Miralax was sent as well as she is to take 17g daily 5 days prior to Colon/EGD. Reminded to hold Ozempic 7 days prior to Colon/EGD

## 2025-03-28 ENCOUNTER — ANESTHESIA (OUTPATIENT)
Dept: GASTROENTEROLOGY | Facility: HOSPITAL | Age: 55
End: 2025-03-28
Payer: COMMERCIAL

## 2025-03-28 ENCOUNTER — HOSPITAL ENCOUNTER (OUTPATIENT)
Dept: GASTROENTEROLOGY | Facility: HOSPITAL | Age: 55
Setting detail: OUTPATIENT SURGERY
End: 2025-03-28
Payer: COMMERCIAL

## 2025-03-28 ENCOUNTER — ANESTHESIA EVENT (OUTPATIENT)
Dept: GASTROENTEROLOGY | Facility: HOSPITAL | Age: 55
End: 2025-03-28
Payer: COMMERCIAL

## 2025-03-28 VITALS
HEIGHT: 61 IN | SYSTOLIC BLOOD PRESSURE: 112 MMHG | TEMPERATURE: 97.5 F | OXYGEN SATURATION: 100 % | BODY MASS INDEX: 29.45 KG/M2 | HEART RATE: 80 BPM | DIASTOLIC BLOOD PRESSURE: 73 MMHG | RESPIRATION RATE: 18 BRPM | WEIGHT: 156 LBS

## 2025-03-28 DIAGNOSIS — Z86.0100 HISTORY OF COLON POLYPS: ICD-10-CM

## 2025-03-28 DIAGNOSIS — K21.9 GASTROESOPHAGEAL REFLUX DISEASE, UNSPECIFIED WHETHER ESOPHAGITIS PRESENT: ICD-10-CM

## 2025-03-28 PROCEDURE — 43235 EGD DIAGNOSTIC BRUSH WASH: CPT | Performed by: INTERNAL MEDICINE

## 2025-03-28 PROCEDURE — 88305 TISSUE EXAM BY PATHOLOGIST: CPT | Performed by: PATHOLOGY

## 2025-03-28 PROCEDURE — 45385 COLONOSCOPY W/LESION REMOVAL: CPT | Performed by: INTERNAL MEDICINE

## 2025-03-28 RX ORDER — LIDOCAINE HYDROCHLORIDE 10 MG/ML
INJECTION, SOLUTION EPIDURAL; INFILTRATION; INTRACAUDAL; PERINEURAL AS NEEDED
Status: DISCONTINUED | OUTPATIENT
Start: 2025-03-28 | End: 2025-03-28

## 2025-03-28 RX ORDER — PROPOFOL 10 MG/ML
INJECTION, EMULSION INTRAVENOUS AS NEEDED
Status: DISCONTINUED | OUTPATIENT
Start: 2025-03-28 | End: 2025-03-28

## 2025-03-28 RX ORDER — SODIUM CHLORIDE 9 MG/ML
INJECTION, SOLUTION INTRAVENOUS CONTINUOUS PRN
Status: DISCONTINUED | OUTPATIENT
Start: 2025-03-28 | End: 2025-03-28

## 2025-03-28 RX ADMIN — PROPOFOL 50 MG: 10 INJECTION, EMULSION INTRAVENOUS at 13:10

## 2025-03-28 RX ADMIN — PROPOFOL 150 MG: 10 INJECTION, EMULSION INTRAVENOUS at 12:56

## 2025-03-28 RX ADMIN — PROPOFOL 50 MG: 10 INJECTION, EMULSION INTRAVENOUS at 13:09

## 2025-03-28 RX ADMIN — PROPOFOL 50 MG: 10 INJECTION, EMULSION INTRAVENOUS at 13:00

## 2025-03-28 RX ADMIN — PROPOFOL 50 MG: 10 INJECTION, EMULSION INTRAVENOUS at 13:04

## 2025-03-28 RX ADMIN — LIDOCAINE HYDROCHLORIDE 50 MG: 10 INJECTION, SOLUTION EPIDURAL; INFILTRATION; INTRACAUDAL; PERINEURAL at 12:53

## 2025-03-28 RX ADMIN — PROPOFOL 50 MG: 10 INJECTION, EMULSION INTRAVENOUS at 13:03

## 2025-03-28 RX ADMIN — SODIUM CHLORIDE: 0.9 INJECTION, SOLUTION INTRAVENOUS at 12:43

## 2025-03-28 NOTE — ANESTHESIA PREPROCEDURE EVALUATION
Procedure:  COLONOSCOPY  EGD    Relevant Problems   CARDIO   (+) Hemorrhoids      GI/HEPATIC   (+) Rectal bleeding      NEURO/PSYCH   (+) Anxiety      PULMONARY   (+) Smoker        Physical Exam    Airway    Mallampati score: II         Dental       Cardiovascular      Pulmonary      Other Findings  post-pubertal.      Anesthesia Plan  ASA Score- 2     Anesthesia Type- IV sedation with anesthesia with ASA Monitors.         Additional Monitors:     Airway Plan:     Comment: IV sedation,  standard ASA monitors. Risks and benefits discussed with patient; patient consented and agrees to proceed.    I saw and evaluated the patient. If seen with CRNA, we have discussed the anesthetic plan and I am in agreement that the plan is appropriate for the patient.  .       Plan Factors-    Chart reviewed.   Existing labs reviewed.                   Induction- intravenous.    Postoperative Plan-         Informed Consent- Anesthetic plan and risks discussed with patient.  I personally reviewed this patient with the CRNA. Discussed and agreed on the Anesthesia Plan with the CRNA..      NPO Status:  Vitals Value Taken Time   Date of last liquid 03/28/25 03/28/25 1119   Time of last liquid 0608 03/28/25 1119   Date of last solid 03/26/25 03/28/25 1119   Time of last solid 2130 03/28/25 1119

## 2025-03-28 NOTE — ANESTHESIA POSTPROCEDURE EVALUATION
Post-Op Assessment Note    CV Status:  Stable  Pain Score: 0    Pain management: adequate       Mental Status:  Alert and awake   Hydration Status:  Euvolemic   PONV Controlled:  Controlled   Airway Patency:  Patent     Post Op Vitals Reviewed: Yes    No anethesia notable event occurred.    Staff: CRNA           Last Filed PACU Vitals:  Vitals Value Taken Time   Temp     Pulse 79    /66    Resp 16    SpO2 100 room air

## 2025-03-28 NOTE — H&P
History and Physical - [facility]  Geovanna Najera 54 y.o. female MRN: [id number]    HPI: Geovanna Najera is a 54 y.o. female who presents for upper endoscopy due to chronic GERD and known gastroparesis and colonoscopy due to colon polyps and history of poor prep    REVIEW OF SYSTEMS: Per the HPI, and otherwise unremarkable.    Historical Information   Past Medical History:   Diagnosis Date    Anxiety     Colon polyps     Gastroparesis      Past Surgical History:   Procedure Laterality Date    BUNIONECTOMY       SECTION      CHOLECYSTECTOMY      COLONOSCOPY      ENDOMETRIAL ABLATION N/A      Social History   Social History     Substance and Sexual Activity   Alcohol Use Yes     Social History     Substance and Sexual Activity   Drug Use No     Social History     Tobacco Use   Smoking Status Every Day    Current packs/day: 0.25    Types: Cigarettes   Smokeless Tobacco Never     Family History   Problem Relation Age of Onset    Cervical cancer Mother     Vaginal cancer Mother     Colon cancer Neg Hx     Colon polyps Neg Hx        Meds/Allergies       Current Outpatient Medications:     omeprazole (PriLOSEC) 40 MG capsule    rosuvastatin (CRESTOR) 5 mg tablet    vortioxetine (Trintellix) 10 MG tablet    bisacodyl (DULCOLAX) 5 mg EC tablet    cyclobenzaprine (FLEXERIL) 5 mg tablet    escitalopram (LEXAPRO) 20 mg tablet    glucosamine-chondroitin 500-400 MG tablet    Green Tea 200 MG CAPS    hydrocortisone-pramoxine (ANALPRAM-HC) 2.5-1 % rectal cream    linaCLOtide (LINZESS) 145 MCG CAPS    LORazepam (ATIVAN) 1 mg tablet    metoclopramide (REGLAN) 5 mg tablet    polyethylene glycol (GLYCOLAX) 17 GM/SCOOP powder    polyethylene glycol (MiraLax) 17 GM/SCOOP powder    Semaglutide,0.25 or 0.5MG/DOS, (Ozempic, 0.25 or 0.5 MG/DOSE,) 2 MG/1.5ML SOPN    traMADol (ULTRAM) 50 mg tablet    venlafaxine (EFFEXOR-XR) 75 mg 24 hr capsule  No current facility-administered medications for this  "encounter.    Facility-Administered Medications Ordered in Other Encounters:     sodium chloride 0.9 % infusion, , Intravenous, Continuous PRN, New Bag at 03/28/25 1243    No Known Allergies    Objective     /80   Pulse 74   Temp 97.5 °F (36.4 °C) (Temporal)   Resp 18   Ht 5' 1\" (1.549 m)   Wt 70.8 kg (156 lb)   LMP 07/12/2021   SpO2 99%   BMI 29.48 kg/m²     PHYSICAL EXAM    General Appearance: NAD, cooperative, alert  Eyes: Anicteric  GI:  Soft, non-tender, non-distended; normal bowel sounds; no masses, no organomegaly   Rectal: Deferred until procedure  Musculoskeletal: No edema.  Skin:  No jaundice    ASSESSMENT/PLAN:  This is a 54 y.o. female here for upper endoscopy and colonoscopy, and she is stable and optimized for her procedure.        "

## 2025-04-02 ENCOUNTER — RESULTS FOLLOW-UP (OUTPATIENT)
Dept: GASTROENTEROLOGY | Facility: CLINIC | Age: 55
End: 2025-04-02

## 2025-06-01 ENCOUNTER — APPOINTMENT (EMERGENCY)
Dept: RADIOLOGY | Facility: HOSPITAL | Age: 55
End: 2025-06-01
Payer: COMMERCIAL

## 2025-06-01 ENCOUNTER — HOSPITAL ENCOUNTER (EMERGENCY)
Facility: HOSPITAL | Age: 55
Discharge: HOME | End: 2025-06-02
Attending: EMERGENCY MEDICINE | Admitting: EMERGENCY MEDICINE
Payer: COMMERCIAL

## 2025-06-01 DIAGNOSIS — R07.89 ATYPICAL CHEST PAIN: Primary | ICD-10-CM

## 2025-06-01 DIAGNOSIS — R11.2 NAUSEA AND VOMITING: ICD-10-CM

## 2025-06-01 LAB
BASOPHILS # BLD AUTO: 0.07 THOUSANDS/ÂΜL (ref 0–0.1)
BASOPHILS NFR BLD AUTO: 1 % (ref 0–1)
EOSINOPHIL # BLD AUTO: 0.12 THOUSAND/ÂΜL (ref 0–0.61)
EOSINOPHIL NFR BLD AUTO: 1 % (ref 0–6)
ERYTHROCYTE [DISTWIDTH] IN BLOOD BY AUTOMATED COUNT: 12.2 % (ref 11.6–15.1)
HCT VFR BLD AUTO: 45.2 % (ref 34.8–46.1)
HGB BLD-MCNC: 15.8 G/DL (ref 11.5–15.4)
IMM GRANULOCYTES # BLD AUTO: 0.04 THOUSAND/UL (ref 0–0.2)
IMM GRANULOCYTES NFR BLD AUTO: 0 % (ref 0–2)
LYMPHOCYTES # BLD AUTO: 3.16 THOUSANDS/ÂΜL (ref 0.6–4.47)
LYMPHOCYTES NFR BLD AUTO: 24 % (ref 14–44)
MCH RBC QN AUTO: 32.4 PG (ref 26.8–34.3)
MCHC RBC AUTO-ENTMCNC: 35 G/DL (ref 31.4–37.4)
MCV RBC AUTO: 93 FL (ref 82–98)
MONOCYTES # BLD AUTO: 1.07 THOUSAND/ÂΜL (ref 0.17–1.22)
MONOCYTES NFR BLD AUTO: 8 % (ref 4–12)
NEUTROPHILS # BLD AUTO: 8.49 THOUSANDS/ÂΜL (ref 1.85–7.62)
NEUTS SEG NFR BLD AUTO: 66 % (ref 43–75)
NRBC BLD AUTO-RTO: 0 /100 WBCS
PLATELET # BLD AUTO: 297 THOUSANDS/UL (ref 149–390)
PMV BLD AUTO: 9.6 FL (ref 8.9–12.7)
RBC # BLD AUTO: 4.88 MILLION/UL (ref 3.81–5.12)
WBC # BLD AUTO: 12.95 THOUSAND/UL (ref 4.31–10.16)

## 2025-06-01 PROCEDURE — 96361 HYDRATE IV INFUSION ADD-ON: CPT

## 2025-06-01 PROCEDURE — 71045 X-RAY EXAM CHEST 1 VIEW: CPT

## 2025-06-01 PROCEDURE — 99285 EMERGENCY DEPT VISIT HI MDM: CPT

## 2025-06-01 PROCEDURE — 99285 EMERGENCY DEPT VISIT HI MDM: CPT | Performed by: EMERGENCY MEDICINE

## 2025-06-01 PROCEDURE — 80053 COMPREHEN METABOLIC PANEL: CPT | Performed by: EMERGENCY MEDICINE

## 2025-06-01 PROCEDURE — 83690 ASSAY OF LIPASE: CPT | Performed by: EMERGENCY MEDICINE

## 2025-06-01 PROCEDURE — 36415 COLL VENOUS BLD VENIPUNCTURE: CPT | Performed by: EMERGENCY MEDICINE

## 2025-06-01 PROCEDURE — 83735 ASSAY OF MAGNESIUM: CPT | Performed by: EMERGENCY MEDICINE

## 2025-06-01 PROCEDURE — 85025 COMPLETE CBC W/AUTO DIFF WBC: CPT | Performed by: EMERGENCY MEDICINE

## 2025-06-01 PROCEDURE — 96375 TX/PRO/DX INJ NEW DRUG ADDON: CPT

## 2025-06-01 PROCEDURE — 93005 ELECTROCARDIOGRAM TRACING: CPT

## 2025-06-01 PROCEDURE — 84484 ASSAY OF TROPONIN QUANT: CPT | Performed by: EMERGENCY MEDICINE

## 2025-06-01 RX ORDER — METOCLOPRAMIDE HYDROCHLORIDE 5 MG/ML
10 INJECTION INTRAMUSCULAR; INTRAVENOUS ONCE
Status: COMPLETED | OUTPATIENT
Start: 2025-06-01 | End: 2025-06-01

## 2025-06-01 RX ORDER — DROPERIDOL 2.5 MG/ML
0.62 INJECTION, SOLUTION INTRAMUSCULAR; INTRAVENOUS ONCE
Status: COMPLETED | OUTPATIENT
Start: 2025-06-02 | End: 2025-06-01

## 2025-06-01 RX ORDER — ACETAMINOPHEN 10 MG/ML
1000 INJECTION, SOLUTION INTRAVENOUS ONCE
Status: COMPLETED | OUTPATIENT
Start: 2025-06-01 | End: 2025-06-01

## 2025-06-01 RX ORDER — PANTOPRAZOLE SODIUM 40 MG/10ML
40 INJECTION, POWDER, LYOPHILIZED, FOR SOLUTION INTRAVENOUS ONCE
Status: COMPLETED | OUTPATIENT
Start: 2025-06-01 | End: 2025-06-01

## 2025-06-01 RX ADMIN — METOCLOPRAMIDE 10 MG: 5 INJECTION, SOLUTION INTRAMUSCULAR; INTRAVENOUS at 23:42

## 2025-06-01 RX ADMIN — SODIUM CHLORIDE 1000 ML: 0.9 INJECTION, SOLUTION INTRAVENOUS at 23:40

## 2025-06-01 RX ADMIN — PANTOPRAZOLE SODIUM 40 MG: 40 INJECTION, POWDER, FOR SOLUTION INTRAVENOUS at 23:42

## 2025-06-01 RX ADMIN — DROPERIDOL 0.62 MG: 2.5 INJECTION, SOLUTION INTRAMUSCULAR; INTRAVENOUS at 23:54

## 2025-06-01 RX ADMIN — ACETAMINOPHEN 1000 MG: 10 INJECTION INTRAVENOUS at 23:45

## 2025-06-02 ENCOUNTER — APPOINTMENT (EMERGENCY)
Dept: CT IMAGING | Facility: HOSPITAL | Age: 55
End: 2025-06-02
Payer: COMMERCIAL

## 2025-06-02 VITALS
WEIGHT: 148 LBS | HEIGHT: 62 IN | OXYGEN SATURATION: 99 % | SYSTOLIC BLOOD PRESSURE: 172 MMHG | RESPIRATION RATE: 18 BRPM | DIASTOLIC BLOOD PRESSURE: 93 MMHG | TEMPERATURE: 97 F | BODY MASS INDEX: 27.23 KG/M2 | HEART RATE: 90 BPM

## 2025-06-02 LAB
ALBUMIN SERPL BCG-MCNC: 4.3 G/DL (ref 3.5–5)
ALP SERPL-CCNC: 52 U/L (ref 34–104)
ALT SERPL W P-5'-P-CCNC: 10 U/L (ref 7–52)
AMPHETAMINES SERPL QL SCN: NEGATIVE
ANION GAP SERPL CALCULATED.3IONS-SCNC: 11 MMOL/L (ref 4–13)
AST SERPL W P-5'-P-CCNC: 18 U/L (ref 13–39)
BARBITURATES UR QL: NEGATIVE
BENZODIAZ UR QL: NEGATIVE
BILIRUB SERPL-MCNC: 0.7 MG/DL (ref 0.2–1)
BUN SERPL-MCNC: 14 MG/DL (ref 5–25)
CALCIUM SERPL-MCNC: 10.1 MG/DL (ref 8.4–10.2)
CARDIAC TROPONIN I PNL SERPL HS: <2 NG/L (ref ?–50)
CHLORIDE SERPL-SCNC: 103 MMOL/L (ref 96–108)
CO2 SERPL-SCNC: 24 MMOL/L (ref 21–32)
COCAINE UR QL: NEGATIVE
CREAT SERPL-MCNC: 1.09 MG/DL (ref 0.6–1.3)
FENTANYL UR QL SCN: NEGATIVE
GFR SERPL CREATININE-BSD FRML MDRD: 57 ML/MIN/1.73SQ M
GLUCOSE SERPL-MCNC: 132 MG/DL (ref 65–140)
HYDROCODONE UR QL SCN: NEGATIVE
LIPASE SERPL-CCNC: 29 U/L (ref 11–82)
MAGNESIUM SERPL-MCNC: 1.8 MG/DL (ref 1.9–2.7)
METHADONE UR QL: NEGATIVE
OPIATES UR QL SCN: NEGATIVE
OXYCODONE+OXYMORPHONE UR QL SCN: NEGATIVE
PCP UR QL: NEGATIVE
POTASSIUM SERPL-SCNC: 3.7 MMOL/L (ref 3.5–5.3)
PROT SERPL-MCNC: 7.5 G/DL (ref 6.4–8.4)
SODIUM SERPL-SCNC: 138 MMOL/L (ref 135–147)
THC UR QL: POSITIVE

## 2025-06-02 PROCEDURE — 96361 HYDRATE IV INFUSION ADD-ON: CPT

## 2025-06-02 PROCEDURE — 71260 CT THORAX DX C+: CPT

## 2025-06-02 PROCEDURE — 74177 CT ABD & PELVIS W/CONTRAST: CPT

## 2025-06-02 PROCEDURE — 96375 TX/PRO/DX INJ NEW DRUG ADDON: CPT

## 2025-06-02 PROCEDURE — 80307 DRUG TEST PRSMV CHEM ANLYZR: CPT | Performed by: EMERGENCY MEDICINE

## 2025-06-02 PROCEDURE — 96365 THER/PROPH/DIAG IV INF INIT: CPT

## 2025-06-02 RX ORDER — MAGNESIUM SULFATE HEPTAHYDRATE 40 MG/ML
2 INJECTION, SOLUTION INTRAVENOUS ONCE
Status: COMPLETED | OUTPATIENT
Start: 2025-06-02 | End: 2025-06-02

## 2025-06-02 RX ORDER — ONDANSETRON 4 MG/1
4 TABLET, FILM COATED ORAL EVERY 6 HOURS
Qty: 12 TABLET | Refills: 0 | Status: SHIPPED | OUTPATIENT
Start: 2025-06-02

## 2025-06-02 RX ORDER — PANTOPRAZOLE SODIUM 20 MG/1
20 TABLET, DELAYED RELEASE ORAL DAILY
Qty: 20 TABLET | Refills: 0 | Status: SHIPPED | OUTPATIENT
Start: 2025-06-02 | End: 2025-06-02 | Stop reason: SDUPTHER

## 2025-06-02 RX ORDER — LORAZEPAM 2 MG/ML
1 INJECTION INTRAMUSCULAR ONCE
Status: COMPLETED | OUTPATIENT
Start: 2025-06-02 | End: 2025-06-02

## 2025-06-02 RX ORDER — ONDANSETRON 2 MG/ML
4 INJECTION INTRAMUSCULAR; INTRAVENOUS ONCE
Status: COMPLETED | OUTPATIENT
Start: 2025-06-02 | End: 2025-06-02

## 2025-06-02 RX ADMIN — SODIUM CHLORIDE 1000 ML: 0.9 INJECTION, SOLUTION INTRAVENOUS at 01:19

## 2025-06-02 RX ADMIN — MAGNESIUM SULFATE HEPTAHYDRATE 2 G: 40 INJECTION, SOLUTION INTRAVENOUS at 00:40

## 2025-06-02 RX ADMIN — ONDANSETRON 4 MG: 2 INJECTION, SOLUTION INTRAMUSCULAR; INTRAVENOUS at 00:40

## 2025-06-02 RX ADMIN — LORAZEPAM 1 MG: 2 INJECTION INTRAMUSCULAR; INTRAVENOUS at 00:52

## 2025-06-02 RX ADMIN — IOHEXOL 100 ML: 350 INJECTION, SOLUTION INTRAVENOUS at 01:19

## 2025-06-02 NOTE — ED NOTES
Patient disconnected her self from the monitor and took her self to the bathroom.      Crystal Dominguez RN  06/02/25 0050

## 2025-06-02 NOTE — ED NOTES
Patient refused to leave blood pressure cuff on, continued to take it off.      Crystal Dominguez, OPAL  06/02/25 0258

## 2025-06-02 NOTE — ED PROVIDER NOTES
Time reflects when diagnosis was documented in both MDM as applicable and the Disposition within this note       Time User Action Codes Description Comment    6/2/2025  2:50 AM Heena De Leon Add [R07.89] Atypical chest pain     6/2/2025  2:50 AM Heena De Leon Add [R11.2] Nausea and vomiting           ED Disposition       ED Disposition   Discharge    Condition   Stable    Date/Time   Mon Jun 2, 2025  2:51 AM    Comment   Tawnya Reinaldo discharge to home/self care.                   Assessment & Plan       Medical Decision Making  54-year-old female with nausea vomiting, epigastric and substernal pain differential diagnosis includes ACS, arrhythmia, gastroparesis, esophagitis, gastritis, GERD, pancreatitis, cyclic vomiting syndrome, small bowel obstruction, pancreatitis, given chest pain status post vomiting concern for esophageal perforation, aspiration    Amount and/or Complexity of Data Reviewed  Labs: ordered.  Radiology: ordered.    Risk  Prescription drug management.        ED Course as of 06/02/25 0255   Sun Jun 01, 2025   2330 On medical record review patient had an EGD 2/13/2025 for history of GERD, no acute issues noted   2351 Procedure Note: EKG  Date/Time: 06/01/25 11:51 PM   Performed by: HEENA DE LEON  Authorized by: HEENA DE LEON  Indications / Diagnosis: CP  ECG reviewed by me, the ED Provider: yes   The EKG demonstrates:  Rhythm: normal sinus  Intervals: normal intervals  Axis: normal axis  QRS/Blocks: normal QRS  ST Changes: No acute ST Changes, no STD/ADEEL.       Mon Jun 02, 2025   0250 Patient resting comfortably no further vomiting, no chest pain received fluids, stable for discharge at this point no indication for further inpatient evaluation or treatment    Did discuss incidental findings of emphysema patient is a smoker recommend quitting at this point she is not hypoxic no indication for inpatient management.  Also patient already on omeprazole,pantoprazole discontinued       Medications  "  metoclopramide (REGLAN) injection 10 mg (10 mg Intravenous Given 6/1/25 2342)   pantoprazole (PROTONIX) injection 40 mg (40 mg Intravenous Given 6/1/25 2342)   acetaminophen (Ofirmev) injection 1,000 mg (0 mg Intravenous Stopped 6/1/25 2359)   sodium chloride 0.9 % bolus 1,000 mL (0 mL Intravenous Stopped 6/2/25 0120)   droperidol (INAPSINE) injection 0.625 mg (0.625 mg Intravenous Given 6/1/25 2354)   magnesium sulfate 2 g/50 mL IVPB (premix) 2 g (0 g Intravenous Stopped 6/2/25 0153)   ondansetron (ZOFRAN) injection 4 mg (4 mg Intravenous Given 6/2/25 0040)   LORazepam (ATIVAN) injection 1 mg (1 mg Intravenous Given 6/2/25 0052)   sodium chloride 0.9 % bolus 1,000 mL (0 mL Intravenous Stopped 6/2/25 0251)   iohexol (OMNIPAQUE) 350 MG/ML injection (MULTI-DOSE) 100 mL (100 mL Intravenous Given 6/2/25 0119)       ED Risk Strat Scores   HEART Risk Score      Flowsheet Row Most Recent Value   Heart Score Risk Calculator    History 0 Filed at: 06/02/2025 0251   ECG 1 Filed at: 06/02/2025 0251   Age 1 Filed at: 06/02/2025 0251   Risk Factors 1 Filed at: 06/02/2025 0251   Troponin 0 Filed at: 06/02/2025 0251   HEART Score 3 Filed at: 06/02/2025 0251          HEART Risk Score      Flowsheet Row Most Recent Value   Heart Score Risk Calculator    History 0 Filed at: 06/02/2025 0251   ECG 1 Filed at: 06/02/2025 0251   Age 1 Filed at: 06/02/2025 0251   Risk Factors 1 Filed at: 06/02/2025 0251   Troponin 0 Filed at: 06/02/2025 0251   HEART Score 3 Filed at: 06/02/2025 0251                      No data recorded                            History of Present Illness       Chief Complaint   Patient presents with    Chest Pain     Pt c/o epigastric to mid sternal pain that has been worsening since 1800 tonight. States \"I've been vomiting for 15 hours straight\".       Past Medical History[1]   Past Surgical History[2]   Family History[3]   Social History[4]   E-Cigarette/Vaping    E-Cigarette Use Never User       E-Cigarette/Vaping " Substances      I have reviewed and agree with the history as documented.     54-year-old female with history of GERD, gastroparesis, anxiety presents for evaluation of nausea vomiting chest tightness and epigastric pain, states that nausea vomiting started around 6 AM, she was vomiting with no diarrhea, started having chest tightness and substernal as well as epigastric pain, symptoms not similar to her previous gastroparesis, symptoms are nonexertional, nonpleuritic.  Patient does admit to smoking marijuana recently does not have a history of cyclic vomiting syndrome, denies drug or alcohol use otherwise.  Previous surgical history includes cholecystectomy        Review of Systems   Constitutional:  Negative for appetite change and fever.   HENT:  Negative for rhinorrhea and sore throat.    Eyes:  Negative for photophobia and visual disturbance.   Respiratory:  Positive for chest tightness. Negative for cough and wheezing.    Cardiovascular:  Positive for chest pain. Negative for palpitations and leg swelling.   Gastrointestinal:  Positive for abdominal pain, nausea and vomiting. Negative for abdominal distention, blood in stool, constipation and diarrhea.   Genitourinary:  Negative for dysuria, flank pain, frequency, hematuria and urgency.   Musculoskeletal:  Negative for back pain.   Skin:  Negative for rash.   Neurological:  Negative for dizziness, weakness and headaches.   All other systems reviewed and are negative.          Objective       ED Triage Vitals   Temperature Pulse Blood Pressure Respirations SpO2 Patient Position - Orthostatic VS   06/01/25 2333 06/01/25 2333 06/01/25 2333 06/01/25 2333 06/01/25 2333 06/02/25 0000   (!) 97 °F (36.1 °C) 81 151/89 20 100 % Sitting      Temp Source Heart Rate Source BP Location FiO2 (%) Pain Score    06/01/25 2333 06/01/25 2333 06/01/25 2333 -- 06/01/25 2333    Temporal Monitor Left arm  8      Vitals      Date and Time Temp Pulse SpO2 Resp BP Pain Score FACES Pain  Rating User   06/02/25 0100 -- 90 99 % 18 172/93 -- -- SV   06/02/25 0000 -- 93 98 % 20 153/80 -- -- SV   06/01/25 2333 97 °F (36.1 °C) 81 100 % 20 151/89 8 -- AD            Physical Exam  Vitals and nursing note reviewed.   Constitutional:       Appearance: She is well-developed.      Comments: Actively vomiting in no acute respiratory distress   HENT:      Head: Normocephalic and atraumatic.     Eyes:      Pupils: Pupils are equal, round, and reactive to light.       Cardiovascular:      Rate and Rhythm: Normal rate and regular rhythm.      Heart sounds: No murmur heard.     No friction rub. No gallop.   Pulmonary:      Effort: Pulmonary effort is normal.      Breath sounds: No wheezing or rales.   Chest:      Chest wall: No tenderness.   Abdominal:      General: There is no distension.      Palpations: Abdomen is soft. There is no mass.      Tenderness: There is abdominal tenderness. There is no guarding or rebound.     Musculoskeletal:      Cervical back: Normal range of motion and neck supple.     Skin:     General: Skin is warm and dry.     Neurological:      Mental Status: She is alert and oriented to person, place, and time.         Results Reviewed       Procedure Component Value Units Date/Time    Rapid drug screen, urine [[id number]] Collected: 06/02/25 0253    Lab Status: No result Specimen: Urine, Clean Catch     Magnesium [[id number]]  (Abnormal) Collected: 06/01/25 2340    Lab Status: Final result Specimen: Blood from Arm, Right Updated: 06/02/25 0023     Magnesium 1.8 mg/dL     HS Troponin 0hr (reflex protocol) [[id number]]  (Normal) Collected: 06/01/25 2340    Lab Status: Final result Specimen: Blood from Arm, Right Updated: 06/02/25 0007     hs TnI 0hr <2 ng/L     Comprehensive metabolic panel [[id number]] Collected: 06/01/25 2340    Lab Status: Final result Specimen: Blood from Arm, Right Updated: 06/02/25 0000     Sodium 138 mmol/L      Potassium 3.7 mmol/L      Chloride 103 mmol/L      CO2 24 mmol/L       ANION GAP 11 mmol/L      BUN 14 mg/dL      Creatinine 1.09 mg/dL      Glucose 132 mg/dL      Calcium 10.1 mg/dL      AST 18 U/L      ALT 10 U/L      Alkaline Phosphatase 52 U/L      Total Protein 7.5 g/dL      Albumin 4.3 g/dL      Total Bilirubin 0.70 mg/dL      eGFR 57 ml/min/1.73sq m     Narrative:      National Kidney Disease Foundation guidelines for Chronic Kidney Disease (CKD):     Stage 1 with normal or high GFR (GFR > 90 mL/min/1.73 square meters)    Stage 2 Mild CKD (GFR = 60-89 mL/min/1.73 square meters)    Stage 3A Moderate CKD (GFR = 45-59 mL/min/1.73 square meters)    Stage 3B Moderate CKD (GFR = 30-44 mL/min/1.73 square meters)    Stage 4 Severe CKD (GFR = 15-29 mL/min/1.73 square meters)    Stage 5 End Stage CKD (GFR <15 mL/min/1.73 square meters)  Note: GFR calculation is accurate only with a steady state creatinine    Lipase [[id number]]  (Normal) Collected: 06/01/25 2340    Lab Status: Final result Specimen: Blood from Arm, Right Updated: 06/02/25 0000     Lipase 29 u/L     CBC and differential [[id number]]  (Abnormal) Collected: 06/01/25 2340    Lab Status: Final result Specimen: Blood from Arm, Right Updated: 06/01/25 2345     WBC 12.95 Thousand/uL      RBC 4.88 Million/uL      Hemoglobin 15.8 g/dL      Hematocrit 45.2 %      MCV 93 fL      MCH 32.4 pg      MCHC 35.0 g/dL      RDW 12.2 %      MPV 9.6 fL      Platelets 297 Thousands/uL      nRBC 0 /100 WBCs      Segmented % 66 %      Immature Grans % 0 %      Lymphocytes % 24 %      Monocytes % 8 %      Eosinophils Relative 1 %      Basophils Relative 1 %      Absolute Neutrophils 8.49 Thousands/µL      Absolute Immature Grans 0.04 Thousand/uL      Absolute Lymphocytes 3.16 Thousands/µL      Absolute Monocytes 1.07 Thousand/µL      Eosinophils Absolute 0.12 Thousand/µL      Basophils Absolute 0.07 Thousands/µL             CT chest abdomen pelvis w contrast   Final Interpretation by Tello Hernandez MD (06/02 0214)      1.  Emphysematous change of  the lungs. No focal consolidation.   2.  Mild diffuse thickening of the colon which may be due to under distention versus colitis.   3.  Diverticulosis without evidence of diverticulitis.         Workstation performed: [id number]         XR chest portable    (Results Pending)       Procedures    ED Medication and Procedure Management   Prior to Admission Medications   Prescriptions Last Dose Informant Patient Reported? Taking?   Green Tea 200 MG CAPS  Self Yes No   Sig: Take by mouth   Patient not taking: Reported on 7/8/2021   LORazepam (ATIVAN) 1 mg tablet  Self Yes No   Sig: Take 1 mg by mouth daily at bedtime as needed   Semaglutide,0.25 or 0.5MG/DOS, (Ozempic, 0.25 or 0.5 MG/DOSE,) 2 MG/1.5ML SOPN  Self Yes No   Sig: Inject 0.25 mg under the skin   Patient not taking: Reported on 2/13/2025   bisacodyl (DULCOLAX) 5 mg EC tablet   No No   Sig: For bowel prep   cyclobenzaprine (FLEXERIL) 5 mg tablet  Self Yes No   Sig: Take 10 mg by mouth as needed for muscle spasms   escitalopram (LEXAPRO) 20 mg tablet  Self Yes No   Sig: Take 20 mg by mouth daily   Patient not taking: Reported on 7/8/2021   glucosamine-chondroitin 500-400 MG tablet  Self Yes No   Sig: Take 1 tablet by mouth daily   Patient not taking: Reported on 7/8/2021   hydrocortisone-pramoxine (ANALPRAM-HC) 2.5-1 % rectal cream  Self No No   Sig: Insert into the rectum 3 (three) times a day   Patient not taking: Reported on 2/13/2025   linaCLOtide (LINZESS) 145 MCG CAPS  Self No No   Sig: Take 1 capsule (145 mcg total) by mouth daily   Patient not taking: Reported on 7/22/2020   metoclopramide (REGLAN) 5 mg tablet  Self No No   Sig: Take 1 tablet (5 mg total) by mouth 4 (four) times a day (with meals and at bedtime)   Patient not taking: Reported on 7/22/2020   omeprazole (PriLOSEC) 40 MG capsule  Self Yes No   Sig: Take 80 mg by mouth daily   polyethylene glycol (GLYCOLAX) 17 GM/SCOOP powder   No No   Sig: Take 17 g by mouth daily   polyethylene glycol  (MiraLax) 17 GM/SCOOP powder   No No   Sig: Take 238 g by mouth once for 1 dose Take 238 g my mouth. Use as directed   rosuvastatin (CRESTOR) 5 mg tablet  Self Yes No   Sig: Take 5 mg by mouth daily   traMADol (ULTRAM) 50 mg tablet  Self Yes No   Sig: Take 50 mg by mouth as needed for moderate pain   venlafaxine (EFFEXOR-XR) 75 mg 24 hr capsule  Self Yes No   Sig: Take 75 mg by mouth daily   Patient not taking: Reported on 2025   vortioxetine (Trintellix) 10 MG tablet  Self Yes No   Sig: Take 10 mg by mouth daily      Facility-Administered Medications: None     Patient's Medications   Discharge Prescriptions    ONDANSETRON (ZOFRAN) 4 MG TABLET    Take 1 tablet (4 mg total) by mouth every 6 (six) hours       Start Date: 2025  End Date: --       Order Dose: 4 mg       Quantity: 12 tablet    Refills: 0     No discharge procedures on file.  ED SEPSIS DOCUMENTATION   Time reflects when diagnosis was documented in both MDM as applicable and the Disposition within this note       Time User Action Codes Description Comment    2025  2:50 AM Heena Angel [R07.89] Atypical chest pain     2025  2:50 AM Heena Angel [R11.2] Nausea and vomiting                      Heena Angel DO  25 0252       [1]   Past Medical History:  Diagnosis Date    Anxiety     Colon polyps     Gastroparesis    [2]   Past Surgical History:  Procedure Laterality Date    BUNIONECTOMY       SECTION      CHOLECYSTECTOMY      COLONOSCOPY      ENDOMETRIAL ABLATION N/A    [3]   Family History  Problem Relation Name Age of Onset    Cervical cancer Mother      Vaginal cancer Mother      Colon cancer Neg Hx      Colon polyps Neg Hx     [4]   Social History  Tobacco Use    Smoking status: Every Day     Current packs/day: 0.25     Types: Cigarettes    Smokeless tobacco: Never   Vaping Use    Vaping status: Never Used   Substance Use Topics    Alcohol use: Yes    Drug use: Yes     Types: Marijuana        Heena Angel DO  25  1441

## 2025-06-06 LAB
ATRIAL RATE: 71 BPM
P AXIS: -1 DEGREES
PR INTERVAL: 146 MS
QRS AXIS: 67 DEGREES
QRSD INTERVAL: 90 MS
QT INTERVAL: 428 MS
QTC INTERVAL: 465 MS
T WAVE AXIS: 62 DEGREES
VENTRICULAR RATE: 71 BPM

## 2025-06-06 PROCEDURE — 93010 ELECTROCARDIOGRAM REPORT: CPT | Performed by: INTERNAL MEDICINE
